# Patient Record
Sex: FEMALE | Race: WHITE | NOT HISPANIC OR LATINO | Employment: OTHER | URBAN - METROPOLITAN AREA
[De-identification: names, ages, dates, MRNs, and addresses within clinical notes are randomized per-mention and may not be internally consistent; named-entity substitution may affect disease eponyms.]

---

## 2017-04-26 RX ORDER — GLATIRAMER ACETATE 40 MG/ML
40 INJECTION, SOLUTION SUBCUTANEOUS 3 TIMES WEEKLY
Qty: 11.76 ML | Refills: 3 | Status: SHIPPED | OUTPATIENT
Start: 2017-04-26 | End: 2017-05-03

## 2017-05-03 ENCOUNTER — OFFICE VISIT (OUTPATIENT)
Dept: NEUROLOGY | Facility: CLINIC | Age: 77
End: 2017-05-03

## 2017-05-03 ENCOUNTER — APPOINTMENT (OUTPATIENT)
Dept: LAB | Facility: HOSPITAL | Age: 77
End: 2017-05-03

## 2017-05-03 VITALS
HEIGHT: 65 IN | WEIGHT: 147.2 LBS | DIASTOLIC BLOOD PRESSURE: 52 MMHG | SYSTOLIC BLOOD PRESSURE: 104 MMHG | HEART RATE: 76 BPM | BODY MASS INDEX: 24.53 KG/M2

## 2017-05-03 DIAGNOSIS — M79.2 NEUROPATHIC PAIN: ICD-10-CM

## 2017-05-03 DIAGNOSIS — G35 MULTIPLE SCLEROSIS (HCC): Primary | ICD-10-CM

## 2017-05-03 DIAGNOSIS — R26.89 BALANCE DISORDER: ICD-10-CM

## 2017-05-03 DIAGNOSIS — R26.9 GAIT ABNORMALITY: ICD-10-CM

## 2017-05-03 DIAGNOSIS — F33.40 RECURRENT MAJOR DEPRESSIVE DISORDER, IN REMISSION (HCC): ICD-10-CM

## 2017-05-03 PROCEDURE — 99214 OFFICE O/P EST MOD 30 MIN: CPT | Performed by: PSYCHIATRY & NEUROLOGY

## 2017-05-03 RX ORDER — CITALOPRAM 40 MG/1
40 TABLET ORAL DAILY
Qty: 30 TABLET | Refills: 11 | Status: SHIPPED | OUTPATIENT
Start: 2017-05-03 | End: 2018-04-29 | Stop reason: SDUPTHER

## 2017-05-04 LAB
ALBUMIN SERPL-MCNC: 4.3 G/DL (ref 3.5–4.8)
ALBUMIN/GLOB SERPL: 2 {RATIO} (ref 1.2–2.2)
ALP SERPL-CCNC: 26 IU/L (ref 39–117)
ALT SERPL-CCNC: 12 IU/L (ref 0–32)
AST SERPL-CCNC: 18 IU/L (ref 0–40)
BASOPHILS # BLD AUTO: 0.1 X10E3/UL (ref 0–0.2)
BASOPHILS NFR BLD AUTO: 1 %
BILIRUB SERPL-MCNC: 0.2 MG/DL (ref 0–1.2)
BUN SERPL-MCNC: 15 MG/DL (ref 8–27)
BUN/CREAT SERPL: 19 (ref 12–28)
CALCIUM SERPL-MCNC: 9.6 MG/DL (ref 8.7–10.3)
CHLORIDE SERPL-SCNC: 103 MMOL/L (ref 96–106)
CO2 SERPL-SCNC: 25 MMOL/L (ref 18–29)
CREAT SERPL-MCNC: 0.77 MG/DL (ref 0.57–1)
EOSINOPHIL # BLD AUTO: 0.1 X10E3/UL (ref 0–0.4)
EOSINOPHIL NFR BLD AUTO: 2 %
ERYTHROCYTE [DISTWIDTH] IN BLOOD BY AUTOMATED COUNT: 13.3 % (ref 12.3–15.4)
GLOBULIN SER CALC-MCNC: 2.2 G/DL (ref 1.5–4.5)
GLUCOSE SERPL-MCNC: 78 MG/DL (ref 65–99)
HCT VFR BLD AUTO: 40.5 % (ref 34–46.6)
HGB BLD-MCNC: 13.8 G/DL (ref 11.1–15.9)
IMM GRANULOCYTES # BLD: 0 X10E3/UL (ref 0–0.1)
IMM GRANULOCYTES NFR BLD: 0 %
LYMPHOCYTES # BLD AUTO: 2 X10E3/UL (ref 0.7–3.1)
LYMPHOCYTES NFR BLD AUTO: 26 %
MCH RBC QN AUTO: 30.4 PG (ref 26.6–33)
MCHC RBC AUTO-ENTMCNC: 34.1 G/DL (ref 31.5–35.7)
MCV RBC AUTO: 89 FL (ref 79–97)
MONOCYTES # BLD AUTO: 0.5 X10E3/UL (ref 0.1–0.9)
MONOCYTES NFR BLD AUTO: 6 %
NEUTROPHILS # BLD AUTO: 4.9 X10E3/UL (ref 1.4–7)
NEUTROPHILS NFR BLD AUTO: 65 %
PLATELET # BLD AUTO: 341 X10E3/UL (ref 150–379)
POTASSIUM SERPL-SCNC: 4.1 MMOL/L (ref 3.5–5.2)
PROT SERPL-MCNC: 6.5 G/DL (ref 6–8.5)
RBC # BLD AUTO: 4.54 X10E6/UL (ref 3.77–5.28)
SODIUM SERPL-SCNC: 145 MMOL/L (ref 134–144)
WBC # BLD AUTO: 7.6 X10E3/UL (ref 3.4–10.8)

## 2017-05-09 LAB
ANNOTATION COMMENT IMP: NORMAL
GAMMA INTERFERON BACKGROUND BLD IA-ACNC: 0.05 IU/ML
M TB IFN-G BLD-IMP: NEGATIVE
M TB IFN-G CD4+ BCKGRND COR BLD-ACNC: 0 IU/ML
M TB IFN-G CD4+ T-CELLS BLD-ACNC: 0.05 IU/ML
MITOGEN IGNF BLD-ACNC: 0.62 IU/ML
SERVICE CMNT-IMP: NORMAL

## 2017-08-09 ENCOUNTER — APPOINTMENT (OUTPATIENT)
Dept: WOMENS IMAGING | Facility: HOSPITAL | Age: 77
End: 2017-08-09

## 2017-08-09 PROCEDURE — G0202 SCR MAMMO BI INCL CAD: HCPCS | Performed by: RADIOLOGY

## 2017-11-02 ENCOUNTER — LAB REQUISITION (OUTPATIENT)
Dept: LAB | Facility: HOSPITAL | Age: 77
End: 2017-11-02

## 2017-11-02 ENCOUNTER — OFFICE VISIT (OUTPATIENT)
Dept: NEUROLOGY | Facility: CLINIC | Age: 77
End: 2017-11-02

## 2017-11-02 VITALS
BODY MASS INDEX: 22.49 KG/M2 | HEART RATE: 74 BPM | WEIGHT: 135 LBS | DIASTOLIC BLOOD PRESSURE: 70 MMHG | HEIGHT: 65 IN | SYSTOLIC BLOOD PRESSURE: 122 MMHG | RESPIRATION RATE: 16 BRPM

## 2017-11-02 DIAGNOSIS — M79.2 NEUROPATHIC PAIN: ICD-10-CM

## 2017-11-02 DIAGNOSIS — G35 MULTIPLE SCLEROSIS (HCC): Primary | ICD-10-CM

## 2017-11-02 DIAGNOSIS — Z00.00 ROUTINE GENERAL MEDICAL EXAMINATION AT A HEALTH CARE FACILITY: ICD-10-CM

## 2017-11-02 DIAGNOSIS — F33.40 RECURRENT MAJOR DEPRESSIVE DISORDER, IN REMISSION (HCC): ICD-10-CM

## 2017-11-02 LAB
ALBUMIN SERPL-MCNC: 4 G/DL (ref 3.2–4.8)
ALBUMIN/GLOB SERPL: 1.9 G/DL (ref 1.5–2.5)
ALP SERPL-CCNC: 31 U/L (ref 25–100)
ALT SERPL W P-5'-P-CCNC: 15 U/L (ref 7–40)
ANION GAP SERPL CALCULATED.3IONS-SCNC: 5 MMOL/L (ref 3–11)
AST SERPL-CCNC: 22 U/L (ref 0–33)
BASOPHILS # BLD AUTO: 0.09 10*3/MM3 (ref 0–0.2)
BASOPHILS NFR BLD AUTO: 1.6 % (ref 0–1)
BILIRUB SERPL-MCNC: 0.5 MG/DL (ref 0.3–1.2)
BUN BLD-MCNC: 16 MG/DL (ref 9–23)
BUN/CREAT SERPL: 26.7 (ref 7–25)
CALCIUM SPEC-SCNC: 9.4 MG/DL (ref 8.7–10.4)
CHLORIDE SERPL-SCNC: 107 MMOL/L (ref 99–109)
CO2 SERPL-SCNC: 28 MMOL/L (ref 20–31)
CREAT BLD-MCNC: 0.6 MG/DL (ref 0.6–1.3)
DEPRECATED RDW RBC AUTO: 44.1 FL (ref 37–54)
EOSINOPHIL # BLD AUTO: 0.17 10*3/MM3 (ref 0–0.3)
EOSINOPHIL NFR BLD AUTO: 3 % (ref 0–3)
ERYTHROCYTE [DISTWIDTH] IN BLOOD BY AUTOMATED COUNT: 13.5 % (ref 11.3–14.5)
GFR SERPL CREATININE-BSD FRML MDRD: 97 ML/MIN/1.73
GLOBULIN UR ELPH-MCNC: 2.1 GM/DL
GLUCOSE BLD-MCNC: 80 MG/DL (ref 70–100)
HCT VFR BLD AUTO: 37.3 % (ref 34.5–44)
HGB BLD-MCNC: 12 G/DL (ref 11.5–15.5)
IMM GRANULOCYTES # BLD: 0.01 10*3/MM3 (ref 0–0.03)
IMM GRANULOCYTES NFR BLD: 0.2 % (ref 0–0.6)
LYMPHOCYTES # BLD AUTO: 1.71 10*3/MM3 (ref 0.6–4.8)
LYMPHOCYTES NFR BLD AUTO: 30.1 % (ref 24–44)
MCH RBC QN AUTO: 28.6 PG (ref 27–31)
MCHC RBC AUTO-ENTMCNC: 32.2 G/DL (ref 32–36)
MCV RBC AUTO: 89 FL (ref 80–99)
MONOCYTES # BLD AUTO: 0.54 10*3/MM3 (ref 0–1)
MONOCYTES NFR BLD AUTO: 9.5 % (ref 0–12)
NEUTROPHILS # BLD AUTO: 3.16 10*3/MM3 (ref 1.5–8.3)
NEUTROPHILS NFR BLD AUTO: 55.6 % (ref 41–71)
PLATELET # BLD AUTO: 259 10*3/MM3 (ref 150–450)
PMV BLD AUTO: 11.6 FL (ref 6–12)
POTASSIUM BLD-SCNC: 3.9 MMOL/L (ref 3.5–5.5)
PROT SERPL-MCNC: 6.1 G/DL (ref 5.7–8.2)
RBC # BLD AUTO: 4.19 10*6/MM3 (ref 3.89–5.14)
SODIUM BLD-SCNC: 140 MMOL/L (ref 132–146)
WBC NRBC COR # BLD: 5.68 10*3/MM3 (ref 3.5–10.8)

## 2017-11-02 PROCEDURE — 80053 COMPREHEN METABOLIC PANEL: CPT | Performed by: PSYCHIATRY & NEUROLOGY

## 2017-11-02 PROCEDURE — 99214 OFFICE O/P EST MOD 30 MIN: CPT | Performed by: PSYCHIATRY & NEUROLOGY

## 2017-11-02 PROCEDURE — 36415 COLL VENOUS BLD VENIPUNCTURE: CPT | Performed by: PSYCHIATRY & NEUROLOGY

## 2017-11-02 PROCEDURE — 85025 COMPLETE CBC W/AUTO DIFF WBC: CPT | Performed by: PSYCHIATRY & NEUROLOGY

## 2017-11-02 RX ORDER — MODAFINIL 100 MG/1
100 TABLET ORAL DAILY
COMMUNITY
End: 2018-12-13

## 2017-11-02 NOTE — PROGRESS NOTES
"Subjective:   Chief Complaint   Patient presents with   • Multiple Sclerosis       Patient ID: Roselia Lobo is a 76 y.o. female.    History of Present Illness     76 y.o.  woman with RRMS, MDD and neuropathic pain returns in follow up.  Last visit on 5/3/17 started Aubagio, pre labs; renewed Celexa, Ampyra, and Lyrica.    RRMS    Tolerating Aubagio without side effects.  Using walker full time.  Ampyra improves balance and walking speed.  No new or worsening sx.      MDD    Mood stable on Celexa.     Neuropathic Pain    Lyrica reduces burning pain legs. Moderate burning/tingling in legs.        The following portions of the patient's history were reviewed and updated as appropriate: allergies, current medications, past medical history, past surgical history and problem list.    Review of Systems   Constitutional: Positive for fatigue. Negative for activity change and unexpected weight change.   HENT: Negative for tinnitus and trouble swallowing.    Eyes: Negative for photophobia and visual disturbance.   Respiratory: Negative for apnea and choking.    Cardiovascular: Negative for leg swelling.   Endocrine: Positive for heat intolerance. Negative for cold intolerance.   Genitourinary: Negative for difficulty urinating, frequency, menstrual problem and urgency.   Musculoskeletal: Positive for gait problem. Negative for back pain.   Skin: Negative for color change.   Allergic/Immunologic: Negative for immunocompromised state.   Neurological: Positive for tremors and speech difficulty. Negative for dizziness, seizures, syncope, facial asymmetry and light-headedness.   Hematological: Negative for adenopathy. Does not bruise/bleed easily.   Psychiatric/Behavioral: Positive for decreased concentration. Negative for behavioral problems, confusion, hallucinations and sleep disturbance.        Objective:  Vitals:    11/02/17 1355   BP: 122/70   Pulse: 74   Resp: 16   Weight: 135 lb (61.2 kg)   Height: 65\" (165.1 cm) "       Neurologic Exam     Mental Status   Oriented to person, place, and time.   Attention: normal. Concentration: normal.   Speech: speech is normal   Level of consciousness: alert  Knowledge: good and consistent with education.   Normal comprehension.     Cranial Nerves     CN II   Visual fields full to confrontation.   Visual acuity: normal  Right visual field deficit: none  Left visual field deficit: none     CN III, IV, VI   Pupils are equal, round, and reactive to light.  Extraocular motions are normal.   Nystagmus: none   Diplopia: none  Ophthalmoparesis: none  Upgaze: normal  Downgaze: normal  Conjugate gaze: present    CN V   Facial sensation intact.   Right corneal reflex: normal  Left corneal reflex: normal    CN VII   Right facial weakness: none  Left facial weakness: none    CN VIII   Hearing: intact    CN IX, X   Palate: symmetric  Right gag reflex: normal  Left gag reflex: normal    CN XI   Right sternocleidomastoid strength: normal  Left sternocleidomastoid strength: normal    CN XII   Tongue: not atrophic  Fasciculations: absent  Tongue deviation: none    Motor Exam   Muscle bulk: normal  Overall muscle tone: normal  Right arm tone: normal  Left arm tone: normal  Right leg tone: normal  Left leg tone: normal    Strength   Strength 5/5 throughout.     Sensory Exam   Light touch normal.     Gait, Coordination, and Reflexes     Gait  Gait: circumduction, shuffling and wide-based (right leg)    Coordination   Romberg: positive  Finger to nose coordination: normal  Heel to shin coordination: abnormal (right leg)  Tandem walking coordination: abnormal    Tremor   Resting tremor: absent  Intention tremor: absent  Action tremor: absent    Reflexes   Reflexes 2+ except as noted.       Physical Exam   Constitutional: She is oriented to person, place, and time. Vital signs are normal. She appears well-developed and well-nourished.   HENT:   Head: Normocephalic.   Eyes: EOM are normal. Pupils are equal, round,  and reactive to light.   Neurological: She is oriented to person, place, and time. She has normal strength. She has an abnormal Heel to Shin Test (right leg), an abnormal Romberg Test and an abnormal Tandem Gait Test. She has a normal Finger-Nose-Finger Test.   Psychiatric: Her speech is normal.   Nursing note and vitals reviewed.    Office Visit on 05/03/2017   Component Date Value Ref Range Status   • WBC 05/03/2017 7.6  3.4 - 10.8 x10E3/uL Final   • RBC 05/03/2017 4.54  3.77 - 5.28 x10E6/uL Final   • Hemoglobin 05/03/2017 13.8  11.1 - 15.9 g/dL Final   • Hematocrit 05/03/2017 40.5  34.0 - 46.6 % Final   • MCV 05/03/2017 89  79 - 97 fL Final   • MCH 05/03/2017 30.4  26.6 - 33.0 pg Final   • MCHC 05/03/2017 34.1  31.5 - 35.7 g/dL Final   • RDW 05/03/2017 13.3  12.3 - 15.4 % Final   • Platelets 05/03/2017 341  150 - 379 x10E3/uL Final   • Neutrophil Rel % 05/03/2017 65  % Final   • Lymphocyte Rel % 05/03/2017 26  % Final   • Monocyte Rel % 05/03/2017 6  % Final   • Eosinophil Rel % 05/03/2017 2  % Final   • Basophil Rel % 05/03/2017 1  % Final   • Neutrophils Absolute 05/03/2017 4.9  1.4 - 7.0 x10E3/uL Final   • Lymphocytes Absolute 05/03/2017 2.0  0.7 - 3.1 x10E3/uL Final   • Monocytes Absolute 05/03/2017 0.5  0.1 - 0.9 x10E3/uL Final   • Eosinophils Absolute 05/03/2017 0.1  0.0 - 0.4 x10E3/uL Final   • Basophils Absolute 05/03/2017 0.1  0.0 - 0.2 x10E3/uL Final   • Immature Granulocyte Rel % 05/03/2017 0  % Final   • Immature Grans Absolute 05/03/2017 0.0  0.0 - 0.1 x10E3/uL Final   • Glucose 05/03/2017 78  65 - 99 mg/dL Final   • BUN 05/03/2017 15  8 - 27 mg/dL Final   • Creatinine 05/03/2017 0.77  0.57 - 1.00 mg/dL Final   • eGFR Non African Am 05/03/2017 76  >59 mL/min/1.73 Final   • eGFR African Am 05/03/2017 87  >59 mL/min/1.73 Final   • BUN/Creatinine Ratio 05/03/2017 19  12 - 28 Final   • Sodium 05/03/2017 145* 134 - 144 mmol/L Final   • Potassium 05/03/2017 4.1  3.5 - 5.2 mmol/L Final   • Chloride  05/03/2017 103  96 - 106 mmol/L Final   • Total CO2 05/03/2017 25  18 - 29 mmol/L Final   • Calcium 05/03/2017 9.6  8.7 - 10.3 mg/dL Final   • Total Protein 05/03/2017 6.5  6.0 - 8.5 g/dL Final   • Albumin 05/03/2017 4.3  3.5 - 4.8 g/dL Final   • Globulin 05/03/2017 2.2  1.5 - 4.5 g/dL Final   • A/G Ratio 05/03/2017 2.0  1.2 - 2.2 Final   • Total Bilirubin 05/03/2017 0.2  0.0 - 1.2 mg/dL Final   • Alkaline Phosphatase 05/03/2017 26* 39 - 117 IU/L Final   • AST (SGOT) 05/03/2017 18  0 - 40 IU/L Final   • ALT (SGPT) 05/03/2017 12  0 - 32 IU/L Final   • QuantiFERON-TB Gold In Tube 05/03/2017 Negative  Negative Final    Comment: The specimen received for QuantiFERON testing was incubated by the  ordering institution.  Specific procedures outlined in our Directory  of Services and in the package insert for the QuantiFERON Gold  (In Tube) test must be followed to enable for proper stimulation of  cells for the production of interferon gamma.     • QuantiFERON Criteria 05/03/2017 Comment   Final    Comment: To be considered positive a specimen should have a TB Ag minus Nil  value greater than or equal to 0.35 IU/mL and in addition the TB Ag  minus Nil value must be greater than or equal to 25% of the Nil  value. There may be insufficient information in these values to  differentiate between some negative and some indeterminate test  values.     • QuantiFERON TB Ag Value 05/03/2017 0.05  IU/mL Final   • QuantiFERON Nil Value 05/03/2017 0.05  IU/mL Final   • QuantiFERON Mitogen Value 05/03/2017 0.62  IU/mL Final   • QFT TB Ag minus Nil Value 05/03/2017 0.00  IU/mL Final   • Interpretation 05/03/2017 Comment   Final    Comment: The QuantiFERON TB Gold (in Tube) assay is intended for use as an aid  in the diagnosis of TB infection. Negative results suggest that there  is no TB infection. In patients with high suspicion of exposure, a  negative test should be repeated. A positive test indicates infection  with Mycobacterium  tuberculosis. Among individuals without  tuberculosis infection, a positive test may be due to exposure to  M. kansasii, M. szulgai or M. marinum. On the Internet, go to  cdc.gov/tb for further details.           Assessment/Plan:       Problems Addressed this Visit        Nervous and Auditory    Multiple sclerosis - Primary     Stable on Aubagio     CBC, CMP          Relevant Orders    CBC & Differential    Comprehensive Metabolic Panel    Neuropathic pain     Controlled on Lyrica            Other    Depression     Psychological condition is unchanged.  Continue current treatment regimen.  Psychological condition  will be reassessed at the next regular appointment.         Relevant Medications    modafinil (PROVIGIL) 100 MG tablet    Teriflunomide (AUBAGIO) 14 MG tablet

## 2017-11-03 LAB
ALBUMIN SERPL-MCNC: 4 G/DL (ref 3.2–4.8)
ALBUMIN/GLOB SERPL: 1.9 G/DL
ALP SERPL-CCNC: 31 U/L (ref 25–100)
AST SERPL-CCNC: 22 U/L (ref 0–33)
BASOPHILS # BLD AUTO: 0.09 10E3/MM3 (ref 0–0.2)
BASOPHILS NFR BLD AUTO: 1.6 % (ref 0–1)
BILIRUB SERPL-MCNC: 0.5 MG/DL (ref 0.3–1.2)
BUN SERPL-MCNC: 16 MG/DL (ref 9–23)
BUN/CREAT SERPL: 26.7 (ref 7–25)
CALCIUM SERPL-MCNC: 9.4 MG/DL (ref 8.7–10.4)
CHLORIDE SERPL-SCNC: 107 MMOL/L (ref 99–109)
CREAT SERPL-MCNC: 0.6 MG/DL (ref 0.6–1.3)
EOSINOPHIL # BLD AUTO: 0.17 10E3/MM3 (ref 0.1–0.3)
EOSINOPHIL NFR BLD AUTO: 3 % (ref 0–3)
ERYTHROCYTE [DISTWIDTH] IN BLOOD BY AUTOMATED COUNT: 13.5 % (ref 11.3–14.5)
GFR SERPLBLD CREATININE-BSD FMLA CKD-EPI: 97 ML/MIN/1.732
GLOBULIN SER CALC-MCNC: 2.1 G/DL
GLUCOSE SERPL-MCNC: 80 MG/DL (ref 70–100)
HCT VFR BLD AUTO: 37.3 % (ref 34.5–44)
HGB BLD-MCNC: 12 G/DL (ref 11.5–15.5)
IMM GRANULOCYTES # BLD: 0.01 10E3/MM3 (ref 0–0.03)
IMM GRANULOCYTES NFR BLD: 0.2 % (ref 0–0.6)
LYMPHOCYTES # BLD AUTO: 1.71 10E3/MM3 (ref 0.6–4.8)
LYMPHOCYTES NFR BLD AUTO: 30.1 % (ref 24–44)
MCH RBC QN AUTO: 28.6 PG (ref 27–31)
MCHC RBC AUTO-ENTMCNC: 32.2 G/DL (ref 32–36)
MCV RBC AUTO: 89 FL (ref 80–99)
MONOCYTES # BLD AUTO: 0.54 10E3/MM3 (ref 0–1)
MONOCYTES NFR BLD AUTO: 9.5 % (ref 0–12)
NEUTROPHILS # BLD AUTO: 3.16 10E3/MM3 (ref 1.5–8.3)
NEUTROPHILS NFR BLD AUTO: 55.6 % (ref 41–71)
PLATELET # BLD AUTO: 259 10E3/MM3 (ref 150–450)
POTASSIUM SERPL-SCNC: 3.9 MMOL/L (ref 3.5–5.5)
PROT SERPL-MCNC: 6.1 G/DL (ref 5.7–8.2)
RBC # BLD AUTO: 4.19 10E6/MM3 (ref 3.89–5.14)
SODIUM SERPL-SCNC: 140 MMOL/L (ref 132–146)
WBC # BLD AUTO: 5.68 10E3/MM3 (ref 3.5–10.8)

## 2017-12-28 RX ORDER — PREGABALIN 200 MG/1
200 CAPSULE ORAL 2 TIMES DAILY
Qty: 60 CAPSULE | Refills: 4 | OUTPATIENT
Start: 2017-12-28 | End: 2021-12-15

## 2017-12-28 NOTE — TELEPHONE ENCOUNTER
Is this ok to refill for the patient till their follow up appointment? Follow up with us is on 5/7/2018. Thanks!!

## 2018-04-30 RX ORDER — CITALOPRAM 40 MG/1
TABLET ORAL
Qty: 90 TABLET | Refills: 0 | Status: SHIPPED | OUTPATIENT
Start: 2018-04-30 | End: 2020-01-01

## 2018-05-07 ENCOUNTER — APPOINTMENT (OUTPATIENT)
Dept: LAB | Facility: HOSPITAL | Age: 78
End: 2018-05-07

## 2018-05-07 ENCOUNTER — LAB REQUISITION (OUTPATIENT)
Dept: LAB | Facility: HOSPITAL | Age: 78
End: 2018-05-07

## 2018-05-07 ENCOUNTER — OFFICE VISIT (OUTPATIENT)
Dept: NEUROLOGY | Facility: CLINIC | Age: 78
End: 2018-05-07

## 2018-05-07 VITALS
RESPIRATION RATE: 18 BRPM | BODY MASS INDEX: 22.49 KG/M2 | DIASTOLIC BLOOD PRESSURE: 70 MMHG | SYSTOLIC BLOOD PRESSURE: 118 MMHG | OXYGEN SATURATION: 97 % | WEIGHT: 135 LBS | HEART RATE: 76 BPM | HEIGHT: 65 IN

## 2018-05-07 DIAGNOSIS — F33.40 RECURRENT MAJOR DEPRESSIVE DISORDER, IN REMISSION (HCC): ICD-10-CM

## 2018-05-07 DIAGNOSIS — R20.2 NUMBNESS AND TINGLING OF BOTH LEGS: ICD-10-CM

## 2018-05-07 DIAGNOSIS — G35 MULTIPLE SCLEROSIS (HCC): ICD-10-CM

## 2018-05-07 DIAGNOSIS — G35 MULTIPLE SCLEROSIS (HCC): Primary | ICD-10-CM

## 2018-05-07 DIAGNOSIS — R20.0 NUMBNESS AND TINGLING OF BOTH LEGS: ICD-10-CM

## 2018-05-07 DIAGNOSIS — Z00.00 ROUTINE GENERAL MEDICAL EXAMINATION AT A HEALTH CARE FACILITY: ICD-10-CM

## 2018-05-07 DIAGNOSIS — M79.2 NEUROPATHIC PAIN: ICD-10-CM

## 2018-05-07 LAB
ALBUMIN SERPL-MCNC: 3.9 G/DL (ref 3.2–4.8)
ALBUMIN/GLOB SERPL: 2.1 G/DL (ref 1.5–2.5)
ALP SERPL-CCNC: 28 U/L (ref 25–100)
ALT SERPL W P-5'-P-CCNC: 18 U/L (ref 7–40)
ANION GAP SERPL CALCULATED.3IONS-SCNC: 7 MMOL/L (ref 3–11)
AST SERPL-CCNC: 21 U/L (ref 0–33)
BASOPHILS # BLD AUTO: 0.07 10*3/MM3 (ref 0–0.2)
BASOPHILS NFR BLD AUTO: 1.1 % (ref 0–1)
BILIRUB SERPL-MCNC: 0.4 MG/DL (ref 0.3–1.2)
BUN BLD-MCNC: 14 MG/DL (ref 9–23)
BUN/CREAT SERPL: 23.3 (ref 7–25)
CALCIUM SPEC-SCNC: 8.7 MG/DL (ref 8.7–10.4)
CHLORIDE SERPL-SCNC: 110 MMOL/L (ref 99–109)
CO2 SERPL-SCNC: 28 MMOL/L (ref 20–31)
CREAT BLD-MCNC: 0.6 MG/DL (ref 0.6–1.3)
DEPRECATED RDW RBC AUTO: 44.1 FL (ref 37–54)
EOSINOPHIL # BLD AUTO: 0.16 10*3/MM3 (ref 0–0.3)
EOSINOPHIL NFR BLD AUTO: 2.5 % (ref 0–3)
ERYTHROCYTE [DISTWIDTH] IN BLOOD BY AUTOMATED COUNT: 13.5 % (ref 11.3–14.5)
GFR SERPL CREATININE-BSD FRML MDRD: 97 ML/MIN/1.73
GLOBULIN UR ELPH-MCNC: 1.9 GM/DL
GLUCOSE BLD-MCNC: 81 MG/DL (ref 70–100)
HCT VFR BLD AUTO: 36.9 % (ref 34.5–44)
HGB BLD-MCNC: 12.1 G/DL (ref 11.5–15.5)
IMM GRANULOCYTES # BLD: 0.02 10*3/MM3 (ref 0–0.03)
IMM GRANULOCYTES NFR BLD: 0.3 % (ref 0–0.6)
LYMPHOCYTES # BLD AUTO: 1.8 10*3/MM3 (ref 0.6–4.8)
LYMPHOCYTES NFR BLD AUTO: 28.4 % (ref 24–44)
MCH RBC QN AUTO: 29.4 PG (ref 27–31)
MCHC RBC AUTO-ENTMCNC: 32.8 G/DL (ref 32–36)
MCV RBC AUTO: 89.8 FL (ref 80–99)
MONOCYTES # BLD AUTO: 0.48 10*3/MM3 (ref 0–1)
MONOCYTES NFR BLD AUTO: 7.6 % (ref 0–12)
NEUTROPHILS # BLD AUTO: 3.83 10*3/MM3 (ref 1.5–8.3)
NEUTROPHILS NFR BLD AUTO: 60.4 % (ref 41–71)
PLATELET # BLD AUTO: 248 10*3/MM3 (ref 150–450)
PMV BLD AUTO: 12.7 FL (ref 6–12)
POTASSIUM BLD-SCNC: 3.5 MMOL/L (ref 3.5–5.5)
PROT SERPL-MCNC: 5.8 G/DL (ref 5.7–8.2)
RBC # BLD AUTO: 4.11 10*6/MM3 (ref 3.89–5.14)
SODIUM BLD-SCNC: 145 MMOL/L (ref 132–146)
WBC NRBC COR # BLD: 6.34 10*3/MM3 (ref 3.5–10.8)

## 2018-05-07 PROCEDURE — 85025 COMPLETE CBC W/AUTO DIFF WBC: CPT | Performed by: PSYCHIATRY & NEUROLOGY

## 2018-05-07 PROCEDURE — 99214 OFFICE O/P EST MOD 30 MIN: CPT | Performed by: PSYCHIATRY & NEUROLOGY

## 2018-05-07 PROCEDURE — 80053 COMPREHEN METABOLIC PANEL: CPT | Performed by: PSYCHIATRY & NEUROLOGY

## 2018-05-07 NOTE — PROGRESS NOTES
Subjective:   Chief Complaint   Patient presents with   • Multiple Sclerosis       Patient ID: Roselia Lobo is a 77 y.o. female.    History of Present Illness     77 y.o.  woman with RRMS, MDD and neuropathic pain returns in follow up.  Last visit on 11/2/17 continued Aubagio, Celexa, Ampyra, and Lyrica, ordered labs.    11/2/17:  CBC,CMP - NCS    RRMS    Notes a few falls when not using walker in kitchen.  Fatigue is severe.      Tolerating Aubagio without side effects.  Ampyra improves balance and walking speed. No new or worsening sx.  T25FW 12.8s sec    MDD    Mood unchagned on Celexa.     Neuropathic Pain    Lyrica reduces burning pain legs. Moderate burning/tingling in legs.  Feet are burning on the bottom all the time.  Feet are red and discolored.      Past Medical History:   Diagnosis Date   • Depression    • Multiple sclerosis      · Assessed By: Isaac Torres (Neurology); Last Assessed: 11 Apr 2013           The following portions of the patient's history were reviewed and updated as appropriate: allergies, current medications, past medical history, past surgical history and problem list.    Review of Systems   Constitutional: Positive for fatigue. Negative for activity change and unexpected weight change.   HENT: Negative for tinnitus and trouble swallowing.    Eyes: Negative for photophobia and visual disturbance.   Respiratory: Negative for apnea and choking.    Cardiovascular: Negative for leg swelling.   Endocrine: Positive for heat intolerance. Negative for cold intolerance.   Genitourinary: Negative for difficulty urinating, frequency, menstrual problem and urgency.   Musculoskeletal: Positive for gait problem. Negative for back pain.   Skin: Negative for color change.   Allergic/Immunologic: Negative for immunocompromised state.   Neurological: Positive for tremors and speech difficulty. Negative for dizziness, seizures, syncope, facial asymmetry and light-headedness.   Hematological: Negative  "for adenopathy. Does not bruise/bleed easily.   Psychiatric/Behavioral: Positive for decreased concentration. Negative for behavioral problems, confusion, hallucinations and sleep disturbance.        Objective:  Vitals:    05/07/18 1421   BP: 118/70   BP Location: Left arm   Patient Position: Sitting   Cuff Size: Adult   Pulse: 76   Resp: 18   SpO2: 97%   Weight: 61.2 kg (135 lb)   Height: 165.1 cm (65\")       Neurologic Exam     Mental Status   Attention: normal. Concentration: normal.   Level of consciousness: alert  Knowledge: good and consistent with education.   Normal comprehension.     Cranial Nerves     CN II   Visual fields full to confrontation.   Visual acuity: normal  Right visual field deficit: none  Left visual field deficit: none     CN III, IV, VI   Nystagmus: none   Diplopia: none  Ophthalmoparesis: none  Upgaze: normal  Downgaze: normal  Conjugate gaze: present    CN V   Facial sensation intact.   Right corneal reflex: normal  Left corneal reflex: normal    CN VII   Right facial weakness: none  Left facial weakness: none    CN VIII   Hearing: intact    CN IX, X   Palate: symmetric  Right gag reflex: normal  Left gag reflex: normal    CN XI   Right sternocleidomastoid strength: normal  Left sternocleidomastoid strength: normal    CN XII   Tongue: not atrophic  Fasciculations: absent  Tongue deviation: none    Motor Exam   Muscle bulk: normal  Overall muscle tone: normal  Right arm tone: normal  Left arm tone: normal  Right leg tone: normal  Left leg tone: normal    Sensory Exam   Right arm light touch: normal  Left arm light touch: normal  Right leg light touch: decreased from knee  Left leg light touch: decreased from knee  Right arm pinprick: normal  Left arm pinprick: normal  Right leg pinprick: decreased from knee  Left leg pinprick: decreased from knee    Gait, Coordination, and Reflexes     Gait  Gait: circumduction, shuffling and wide-based (right leg)    Coordination   Romberg: " positive  Tandem walking coordination: abnormal    Tremor   Resting tremor: absent  Intention tremor: absent  Action tremor: absent    Reflexes   Reflexes 2+ except as noted.   Right patellar: 0  Left patellar: 0  Right achilles: 0  Left achilles: 0      Physical Exam   Constitutional: She appears well-developed and well-nourished.   Neurological: She has an abnormal Romberg Test and an abnormal Tandem Gait Test.   Reflex Scores:       Patellar reflexes are 0 on the right side and 0 on the left side.       Achilles reflexes are 0 on the right side and 0 on the left side.  Nursing note and vitals reviewed.    Orders Only on 11/02/2017   Component Date Value Ref Range Status   • WBC 11/03/2017 5.68  3.50 - 10.80 10E3/mm3 Final   • RBC 11/03/2017 4.19  3.89 - 5.14 10E6/mm3 Final   • Hemoglobin 11/03/2017 12.0  11.5 - 15.5 g/dL Final   • Hematocrit 11/03/2017 37.3  34.5 - 44.0 % Final   • MCV 11/03/2017 89.0  80.0 - 99.0 fL Final   • MCH 11/03/2017 28.6  27.0 - 31.0 pg Final   • MCHC 11/03/2017 32.2  32.0 - 36.0 g/dL Final   • RDW 11/03/2017 13.5  11.3 - 14.5 % Final   • Platelets 11/03/2017 259  150 - 450 10E3/mm3 Final   • Neutrophil Rel % 11/03/2017 55.6  41.0 - 71.0 % Final   • Lymphocyte Rel % 11/03/2017 30.1  24.0 - 44.0 % Final   • Monocyte Rel % 11/03/2017 9.5  0.0 - 12.0 % Final   • Eosinophil Rel % 11/03/2017 3.0  0.0 - 3.0 % Final   • Basophil Rel % 11/03/2017 1.6* 0.0 - 1.0 % Final   • Neutrophils Absolute 11/03/2017 3.16  1.50 - 8.30 10E3/mm3 Final   • Lymphocytes Absolute 11/03/2017 1.71  0.60 - 4.80 10E3/mm3 Final   • Monocytes Absolute 11/03/2017 0.54  0.00 - 1.00 10E3/mm3 Final   • Eosinophils Absolute 11/03/2017 0.17  0.10 - 0.30 10E3/mm3 Final   • Basophils Absolute 11/03/2017 0.09  0.00 - 0.20 10E3/mm3 Final   • Immature Granulocyte Rel % 11/03/2017 0.2  0.0 - 0.6 % Final   • Immature Grans Absolute 11/03/2017 0.01  0.00 - 0.03 10E3/mm3 Final   • Glucose 11/03/2017 80  70 - 100 mg/dL Final   • BUN  11/03/2017 16  9 - 23 mg/dL Final   • Creatinine 11/03/2017 0.60  0.60 - 1.30 mg/dL Final   • eGFR Non  Am 11/03/2017 97  mL/min/1.732 Final    Comment:                                 --------------------------------------                                  [employer] Guidelines                                  Stage      Description           GFR                                  1          Normal or High        90+                                  2          Mild decrease         60-89                                  3          Moderate decrease     30-59                                  4          Severe decrease       15-29                                  5          Kidney failure        <15     • BUN/Creatinine Ratio 11/03/2017 26.7* 7.0 - 25.0 Final   • Sodium 11/03/2017 140  132 - 146 mmol/L Final   • Potassium 11/03/2017 3.9  3.5 - 5.5 mmol/L Final   • Chloride 11/03/2017 107  99 - 109 mmol/L Final   • Calcium 11/03/2017 9.4  8.7 - 10.4 mg/dL Final   • Total Protein 11/03/2017 6.1  5.7 - 8.2 g/dL Final   • Albumin 11/03/2017 4.0  3.2 - 4.8 g/dL Final   • Globulin 11/03/2017 2.1  g/dL Final   • A/G Ratio 11/03/2017 1.9  g/dL Final   • Total Bilirubin 11/03/2017 0.5  0.3 - 1.2 mg/dL Final   • Alkaline Phosphatase 11/03/2017 31  25 - 100 U/L Final   • AST (SGOT) 11/03/2017 22  0 - 33 U/L Final         Assessment/Plan:       Problems Addressed this Visit        Nervous and Auditory    Multiple sclerosis - Primary     Sx stable on Aubagio     CBC, CMP    MRI Brain         Relevant Orders    CBC & Differential    Comprehensive Metabolic Panel    MRI Brain Without Contrast    Neuropathic pain     Worsening pain and numbness in feet.      Continue Lyrica 200 mg BID    EMG/NCS cami LE             Other    Depression     Psychological condition is unchanged.  Continue current treatment regimen.  Psychological condition  will be reassessed at the next regular appointment.           Other Visit  Diagnoses     Numbness and tingling of both legs        Relevant Orders    EMG

## 2018-05-12 ENCOUNTER — RESULTS ENCOUNTER (OUTPATIENT)
Dept: NEUROLOGY | Facility: CLINIC | Age: 78
End: 2018-05-12

## 2018-05-12 DIAGNOSIS — G35 MULTIPLE SCLEROSIS (HCC): ICD-10-CM

## 2018-05-14 RX ORDER — DALFAMPRIDINE 10 MG/1
TABLET, FILM COATED, EXTENDED RELEASE ORAL
Qty: 60 TABLET | Refills: 10 | Status: SHIPPED | OUTPATIENT
Start: 2018-05-14 | End: 2019-03-25 | Stop reason: SDUPTHER

## 2018-05-14 RX ORDER — RENAGEL 800 MG/1
TABLET ORAL
Qty: 28 TABLET | Refills: 10 | Status: SHIPPED | OUTPATIENT
Start: 2018-05-14 | End: 2019-03-25 | Stop reason: SDUPTHER

## 2018-05-23 ENCOUNTER — OFFICE VISIT (OUTPATIENT)
Dept: NEUROLOGY | Facility: CLINIC | Age: 78
End: 2018-05-23

## 2018-05-23 DIAGNOSIS — M79.2 NEUROPATHIC PAIN: Primary | ICD-10-CM

## 2018-05-23 PROCEDURE — 95886 MUSC TEST DONE W/N TEST COMP: CPT | Performed by: PSYCHIATRY & NEUROLOGY

## 2018-05-23 PROCEDURE — 95910 NRV CNDJ TEST 7-8 STUDIES: CPT | Performed by: PSYCHIATRY & NEUROLOGY

## 2018-05-23 NOTE — PROGRESS NOTES
[facility] Neurology [place]   Electrodiagnostic Laboratory    Nerve Conduction & EMG Report        Patient:  Roselia oLbo   Patient ID: [id number]   YOB: 1940  Sex:  female      Exam Physician: Isaac Torres MD     Electromyogram and Nerve Conduction Velocity Procedure Note    Hx: 77 y.o. right handed female with complaint of numbness involving the both lower extremities. Symptoms have been present for several months and were provoked by no clear event. Significant past medical history includes multiple sclerosis. Medications include Lyrica. Family history no family history of nerve or muscle disease.    Exam: Motor power is normal in LE. There is no atrophy. There are no fasciculations. Deep tendon reflexes are absent. Sensory exam is abnormal distal symmetrical loss of pin and light touch.    Edx studies of the bilateral lower extremities were performed to evaluate for peripheral neuropathy    NCS Examination   For sensory nerve conduction studies, the amplitude is measured peak-to-peak, the latency reported is the distal peak latency, and the conduction velocity, if measured, is determined from onset latencies and is over the forearm.   For motor nerve conduction studies, the amplitude is measured baseline-to-peak, the latency reported is the distal onset latency, the conduction velocity is calculated over the forearm, and the F wave latency is the minimum latency.   Unless otherwise noted, the hand temperature was monitored continuously and remained between 32°C and 36°C during the performance of the NCSs.        Sensory NCS      Nerve / Sites Rec. Site Onset Lat Peak Lat NP Amp PP Amp Segments Distance Velocity     ms ms µV µV  cm m/s   L SURAL - Lat Mall Antidr      Calf Lat Mall NR NR NR NR Calf - Lat Mall 14 NR      Ref.   4.20 5.0  Ref.     R SURAL - Lat Mall Antidr      Calf Lat Mall NR NR NR NR Calf - Lat Mall 14 NR      Ref.   4.20 5.0  Ref.       Motor NCS      Nerve / Sites Rec.  Site Lat Amp Seq Amp Segments Dist Velocity     ms mV %  cm m/s   L COMM PERONEAL - EDB      Ankle EDB NR NR NR Ankle - EDB 8       Ref.  6.00 2.0  Ref.     R COMM PERONEAL - EDB      Ankle EDB NR NR NR Ankle - EDB 8       Ref.  6.00 2.0  Ref.     L TIBIAL (KNEE) - AH      Ankle AH 4.25 5.8 100 Ankle - AH 8       Ref.  6.00 2.5  Ref.        Knee AH 15.80 2.7 47 Knee - Ankle 39 33.8      Ref.     Ref.  39.0   R TIBIAL (KNEE) - AH      Ankle AH 5.30 5.6 100 Ankle - AH 8       Ref.  6.00 2.5  Ref.        Knee AH 15.00 5.1 91.1 Knee - Ankle 39 40.2      Ref.     Ref.  39.0   R COMM PERONEAL - Tib Ant      Fib Head Tib Ant 2.85 1.7 100 Fib Head - Tib Ant        Knee Tib Ant 3.95 1.9 112 Knee - Fib Head 9 81.8   L COMM PERONEAL - Tib Ant      Fib Head Tib Ant 3.15 2.1 100 Fib Head - Tib Ant        Knee Tib Ant 4.90 2.7 130 Knee - Fib Head 9 51.4     F  Wave      Nerve Fmin    ms   L TIBIAL (KNEE) 57.05   REF 56.00   R TIBIAL (KNEE) 57.50   REF 56.00     H Reflex      Nerve H Lat    ms   L TIBIAL (KNEE) - [place] (S1) 36.50   Ref 36.00   R TIBIAL (KNEE) - [place]ole[place] (S1) 36.65   Ref 36.00             EMG Examination   The study was performed with a concentric needle electrode. Fibrillation and fasciculation activity is graded from none (0) to continuous (4+). The configuration and recruitment pattern of motor unit action potentials under voluntary control, if not normal, are described    EMG Summary Table     Spontaneous MUAP Recruitment    IA Fib PSW Fasc H.F. Amp Dur. PPP Pattern   L. ILIOPSOAS N None None None None N N N N   R. ILIOPSOAS N None None None None N N N N   L. GLUTEUS MAX N None None None None N N N N   R. GLUTEUS MAX N None None None None N N N N   L. QUADRICEPS N None None None None N N N N   R. QUADRICEPS N None None None None N N N N   L. BIC FEM (L HEAD) N None None None None N N N N   R. BIC FEM (L HEAD) N None None None None N N N N   L. GASTROCN (MED) N None None None None N N N N   R. GASTROCN (MED) N  None None None None N N N N   L. TIB ANTERIOR N None None None None N N N N   R. TIB ANTERIOR N None None None None N N N N       · Left common peroneal motor responses were absent from the EDB and intact at the TA  · Left tibial motor responses revealed decreased conduction velocities from fibular head to ankle and F waves were prolonged  · Left sural sensory responses were absent  · Left H-reflex responses were prolonged  · Right common peroneal motor responses were absent from the EDB and intact at the TA  · Right tibial motor responses were normal and F waves were prolonged  · Right sural sensory responses were absent  · Right  H-reflex responses were prolonged  · Needle examination with a concentric needle electrode of selected muscles of the bilateral lower extremities were normal      Conclusion: This study showed neurophysiologic evidence of a 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          Instrument used:  Teca Synergy        Performed by:          Isaac Torres MD

## 2018-06-15 LAB
ALBUMIN SERPL-MCNC: 3.9 G/DL (ref 3.2–4.8)
ALBUMIN/GLOB SERPL: 2.1 G/DL (ref 1.5–2.5)
ALP SERPL-CCNC: 28 U/L (ref 25–100)
ALT SERPL-CCNC: 18 U/L (ref 7–40)
AST SERPL-CCNC: 21 U/L (ref 0–33)
BASOPHILS # BLD AUTO: 0.07 10E3/MM3 (ref 0–0.2)
BASOPHILS NFR BLD AUTO: 1.1 % (ref 0–1)
BILIRUB SERPL-MCNC: 0.4 MG/DL (ref 0.3–1.2)
BUN SERPL-MCNC: 14 MG/DL (ref 9–23)
BUN/CREAT SERPL: 23.3 (ref 7–25)
CALCIUM SERPL-MCNC: 8.7 MG/DL (ref 8.7–10.4)
CHLORIDE SERPL-SCNC: 110 MMOL/L (ref 99–109)
CO2 SERPL-SCNC: 28 MMOL/L (ref 20–31)
CREAT SERPL-MCNC: 0.6 MG/DL (ref 0.6–1.3)
EOSINOPHIL # BLD AUTO: 0.16 10E3/MM3 (ref 0.1–0.3)
EOSINOPHIL NFR BLD AUTO: 2.5 % (ref 0–3)
ERYTHROCYTE [DISTWIDTH] IN BLOOD BY AUTOMATED COUNT: 13.5 % (ref 11.3–14.5)
GFR SERPLBLD CREATININE-BSD FMLA CKD-EPI: 97 ML/MIN/1.73
GLOBULIN SER CALC-MCNC: 1.9 G/DL
GLUCOSE SERPL-MCNC: 81 MG/DL (ref 70–100)
HCT VFR BLD AUTO: 36.9 % (ref 34.5–44)
HGB BLD-MCNC: 12.1 G/DL (ref 11.5–15.5)
IMM GRANULOCYTES # BLD: 0.02 10E3/MM3 (ref 0–0.03)
IMM GRANULOCYTES NFR BLD: 0.3 % (ref 0–0.6)
LYMPHOCYTES # BLD AUTO: 1.8 10E3/MM3 (ref 0.6–4.8)
LYMPHOCYTES NFR BLD AUTO: 28.4 % (ref 24–44)
MCH RBC QN AUTO: 29.4 PG (ref 27–31)
MCHC RBC AUTO-ENTMCNC: 32.8 G/DL (ref 32–36)
MCV RBC AUTO: 89.8 FL (ref 80–99)
MONOCYTES # BLD AUTO: 0.48 10E3/MM3 (ref 0–1)
MONOCYTES NFR BLD AUTO: 7.6 % (ref 0–12)
NEUTROPHILS # BLD AUTO: 3.83 10E3/MM3 (ref 1.5–8.3)
NEUTROPHILS NFR BLD AUTO: 60.4 % (ref 41–71)
PLATELET # BLD AUTO: 248 10E3/MM3 (ref 150–450)
POTASSIUM SERPL-SCNC: 3.5 MMOL/L (ref 3.5–5.5)
PROT SERPL-MCNC: 5.8 G/DL (ref 5.7–8.2)
RBC # BLD AUTO: 4.11 10E6/MM3 (ref 3.89–5.14)
SODIUM SERPL-SCNC: 145 MMOL/L (ref 132–146)
WBC # BLD AUTO: 6.34 10E3/MM3 (ref 3.5–10.8)

## 2018-09-19 ENCOUNTER — APPOINTMENT (OUTPATIENT)
Dept: WOMENS IMAGING | Facility: HOSPITAL | Age: 78
End: 2018-09-19

## 2018-09-19 PROCEDURE — 77067 SCR MAMMO BI INCL CAD: CPT | Performed by: RADIOLOGY

## 2018-11-19 DIAGNOSIS — G35 MULTIPLE SCLEROSIS (HCC): Primary | ICD-10-CM

## 2018-12-13 ENCOUNTER — OFFICE VISIT (OUTPATIENT)
Dept: NEUROLOGY | Facility: CLINIC | Age: 78
End: 2018-12-13

## 2018-12-13 ENCOUNTER — LAB REQUISITION (OUTPATIENT)
Dept: LAB | Facility: HOSPITAL | Age: 78
End: 2018-12-13

## 2018-12-13 VITALS
OXYGEN SATURATION: 98 % | SYSTOLIC BLOOD PRESSURE: 122 MMHG | WEIGHT: 120 LBS | HEIGHT: 65 IN | HEART RATE: 78 BPM | DIASTOLIC BLOOD PRESSURE: 76 MMHG | BODY MASS INDEX: 19.99 KG/M2

## 2018-12-13 DIAGNOSIS — M79.2 NEUROPATHIC PAIN: ICD-10-CM

## 2018-12-13 DIAGNOSIS — F33.40 RECURRENT MAJOR DEPRESSIVE DISORDER, IN REMISSION (HCC): ICD-10-CM

## 2018-12-13 DIAGNOSIS — G62.9 POLYNEUROPATHY: ICD-10-CM

## 2018-12-13 DIAGNOSIS — Z00.00 ROUTINE GENERAL MEDICAL EXAMINATION AT A HEALTH CARE FACILITY: ICD-10-CM

## 2018-12-13 DIAGNOSIS — G35 MULTIPLE SCLEROSIS (HCC): Primary | ICD-10-CM

## 2018-12-13 LAB
ALBUMIN SERPL-MCNC: 4.23 G/DL (ref 3.2–4.8)
ALBUMIN/GLOB SERPL: 2.3 G/DL (ref 1.5–2.5)
ALP SERPL-CCNC: 30 U/L (ref 25–100)
ALT SERPL W P-5'-P-CCNC: 18 U/L (ref 7–40)
ANION GAP SERPL CALCULATED.3IONS-SCNC: 8 MMOL/L (ref 3–11)
AST SERPL-CCNC: 21 U/L (ref 0–33)
BASOPHILS # BLD AUTO: 0.06 10*3/MM3 (ref 0–0.2)
BASOPHILS NFR BLD AUTO: 0.8 % (ref 0–1)
BILIRUB SERPL-MCNC: 0.4 MG/DL (ref 0.3–1.2)
BUN BLD-MCNC: 10 MG/DL (ref 9–23)
BUN/CREAT SERPL: 14.9 (ref 7–25)
CALCIUM SPEC-SCNC: 9.6 MG/DL (ref 8.7–10.4)
CHLORIDE SERPL-SCNC: 108 MMOL/L (ref 99–109)
CO2 SERPL-SCNC: 27 MMOL/L (ref 20–31)
CREAT BLD-MCNC: 0.67 MG/DL (ref 0.6–1.3)
DEPRECATED RDW RBC AUTO: 44.9 FL (ref 37–54)
EOSINOPHIL # BLD AUTO: 0.19 10*3/MM3 (ref 0–0.3)
EOSINOPHIL NFR BLD AUTO: 2.7 % (ref 0–3)
ERYTHROCYTE [DISTWIDTH] IN BLOOD BY AUTOMATED COUNT: 13.4 % (ref 11.3–14.5)
GFR SERPL CREATININE-BSD FRML MDRD: 85 ML/MIN/1.73
GLOBULIN UR ELPH-MCNC: 1.9 GM/DL
GLUCOSE BLD-MCNC: 89 MG/DL (ref 70–100)
HCT VFR BLD AUTO: 39 % (ref 34.5–44)
HGB BLD-MCNC: 12.6 G/DL (ref 11.5–15.5)
IMM GRANULOCYTES # BLD: 0.01 10*3/MM3 (ref 0–0.03)
IMM GRANULOCYTES NFR BLD: 0.1 % (ref 0–0.6)
LYMPHOCYTES # BLD AUTO: 2.1 10*3/MM3 (ref 0.6–4.8)
LYMPHOCYTES NFR BLD AUTO: 29.4 % (ref 24–44)
MCH RBC QN AUTO: 29.8 PG (ref 27–31)
MCHC RBC AUTO-ENTMCNC: 32.3 G/DL (ref 32–36)
MCV RBC AUTO: 92.2 FL (ref 80–99)
MONOCYTES # BLD AUTO: 0.65 10*3/MM3 (ref 0–1)
MONOCYTES NFR BLD AUTO: 9.1 % (ref 0–12)
NEUTROPHILS # BLD AUTO: 4.15 10*3/MM3 (ref 1.5–8.3)
NEUTROPHILS NFR BLD AUTO: 58 % (ref 41–71)
PLATELET # BLD AUTO: 296 10*3/MM3 (ref 150–450)
PMV BLD AUTO: 11.7 FL (ref 6–12)
POTASSIUM BLD-SCNC: 3.9 MMOL/L (ref 3.5–5.5)
PROT SERPL-MCNC: 6.1 G/DL (ref 5.7–8.2)
RBC # BLD AUTO: 4.23 10*6/MM3 (ref 3.89–5.14)
SODIUM BLD-SCNC: 143 MMOL/L (ref 132–146)
WBC NRBC COR # BLD: 7.15 10*3/MM3 (ref 3.5–10.8)

## 2018-12-13 PROCEDURE — 99214 OFFICE O/P EST MOD 30 MIN: CPT | Performed by: PSYCHIATRY & NEUROLOGY

## 2018-12-13 PROCEDURE — 80053 COMPREHEN METABOLIC PANEL: CPT | Performed by: PSYCHIATRY & NEUROLOGY

## 2018-12-13 PROCEDURE — 85025 COMPLETE CBC W/AUTO DIFF WBC: CPT | Performed by: PSYCHIATRY & NEUROLOGY

## 2018-12-13 NOTE — PROGRESS NOTES
Subjective:   Chief Complaint   Patient presents with   • Multiple Sclerosis       Patient ID: Roselia Lobo is a 78 y.o. female.    History of Present Illness     78 y.o.  woman with RRMS, MDD and neuropathic pain returns in follow up.  Last visit on 5/7/18 continued Aubagio, Celexa, Ampyra, and Lyrica, ordered EMG and labs.    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  Legs are weak and trouble standing for over 10 minutes.      Fatigue is severe.      Tolerating Aubagio without side effects.      Ampyra improves balance and walking speed. No new or worsening sx.      T25FW 12.8s sec    MDD    Mood unchagned on Celexa.     Neuropathic Pain    Lyrica reduces burning pain legs. Moderate burning/tingling in legs.  Feet are burning on the bottom all the time.  Feet are red and discolored.      Past Medical History:   Diagnosis Date   • Depression    • Multiple sclerosis (CMS/HCC)      · Assessed By: Isaac Torres (Neurology); Last Assessed: 11 Apr 2013           The following portions of the patient's history were reviewed and updated as appropriate: allergies, current medications, past medical history, past surgical history and problem list.    Review of Systems   Constitutional: Positive for fatigue. Negative for activity change and unexpected weight change.   HENT: Negative for tinnitus and trouble swallowing.    Eyes: Negative for photophobia and visual disturbance.   Respiratory: Negative for apnea and choking.    Cardiovascular: Negative for leg swelling.   Endocrine: Positive for heat intolerance. Negative for cold intolerance.   Genitourinary: Negative for difficulty urinating, frequency, menstrual problem and urgency.   Musculoskeletal: Positive for gait problem. Negative for back pain.   Skin: Negative  "for color change.   Allergic/Immunologic: Negative for immunocompromised state.   Neurological: Positive for tremors and speech difficulty. Negative for dizziness, seizures, syncope, facial asymmetry and light-headedness.   Hematological: Negative for adenopathy. Does not bruise/bleed easily.   Psychiatric/Behavioral: Positive for decreased concentration. Negative for behavioral problems, confusion, hallucinations and sleep disturbance.        Objective:  Vitals:    12/13/18 1320   BP: 122/76   BP Location: Right arm   Patient Position: Sitting   Cuff Size: Adult   Pulse: 78   SpO2: 98%   Weight: 54.4 kg (120 lb)   Height: 165.1 cm (65\")       Neurologic Exam     Mental Status   Attention: normal. Concentration: normal.   Level of consciousness: alert  Knowledge: good and consistent with education.   Normal comprehension.     Cranial Nerves     CN II   Visual fields full to confrontation.   Visual acuity: normal  Right visual field deficit: none  Left visual field deficit: none     CN III, IV, VI   Nystagmus: none   Diplopia: none  Ophthalmoparesis: none  Upgaze: normal  Downgaze: normal  Conjugate gaze: present    CN V   Facial sensation intact.   Right corneal reflex: normal  Left corneal reflex: normal    CN VII   Right facial weakness: none  Left facial weakness: none    CN VIII   Hearing: intact    CN IX, X   Palate: symmetric  Right gag reflex: normal  Left gag reflex: normal    CN XI   Right sternocleidomastoid strength: normal  Left sternocleidomastoid strength: normal    CN XII   Tongue: not atrophic  Fasciculations: absent  Tongue deviation: none    Motor Exam   Muscle bulk: normal  Overall muscle tone: normal  Right arm tone: normal  Left arm tone: normal  Right leg tone: normal  Left leg tone: normal    Sensory Exam   Right arm light touch: normal  Left arm light touch: normal  Right leg light touch: decreased from knee  Left leg light touch: decreased from knee  Right arm pinprick: normal  Left arm " pinprick: normal  Right leg pinprick: decreased from knee  Left leg pinprick: decreased from knee    Gait, Coordination, and Reflexes     Gait  Gait: circumduction, shuffling and wide-based (right leg)    Coordination   Romberg: positive  Tandem walking coordination: abnormal    Tremor   Resting tremor: absent  Intention tremor: absent  Action tremor: absent    Reflexes   Reflexes 2+ except as noted.   Right patellar: 0  Left patellar: 0  Right achilles: 0  Left achilles: 0      Physical Exam   Constitutional: She appears well-developed and well-nourished.   Neurological: She has an abnormal Romberg Test and an abnormal Tandem Gait Test.   Reflex Scores:       Patellar reflexes are 0 on the right side and 0 on the left side.       Achilles reflexes are 0 on the right side and 0 on the left side.  Nursing note and vitals reviewed.    Orders Only on 05/07/2018   Component Date Value Ref Range Status   • WBC 05/07/2018 6.34  3.50 - 10.80 10E3/mm3 Final   • RBC 05/07/2018 4.11  3.89 - 5.14 10E6/mm3 Final   • Hemoglobin 05/07/2018 12.1  11.5 - 15.5 g/dL Final   • Hematocrit 05/07/2018 36.9  34.5 - 44.0 % Final   • MCV 05/07/2018 89.8  80.0 - 99.0 fL Final   • MCH 05/07/2018 29.4  27.0 - 31.0 pg Final   • MCHC 05/07/2018 32.8  32.0 - 36.0 g/dL Final   • RDW 05/07/2018 13.5  11.3 - 14.5 % Final   • Platelets 05/07/2018 248  150 - 450 10E3/mm3 Final   • Neutrophil Rel % 05/07/2018 60.4  41.0 - 71.0 % Final   • Lymphocyte Rel % 05/07/2018 28.4  24.0 - 44.0 % Final   • Monocyte Rel % 05/07/2018 7.6  0.0 - 12.0 % Final   • Eosinophil Rel % 05/07/2018 2.5  0.0 - 3.0 % Final   • Basophil Rel % 05/07/2018 1.1* 0.0 - 1.0 % Final   • Neutrophils Absolute 05/07/2018 3.83  1.50 - 8.30 10E3/mm3 Final   • Lymphocytes Absolute 05/07/2018 1.80  0.60 - 4.80 10E3/mm3 Final   • Monocytes Absolute 05/07/2018 0.48  0.00 - 1.00 10E3/mm3 Final   • Eosinophils Absolute 05/07/2018 0.16  0.10 - 0.30 10E3/mm3 Final   • Basophils Absolute 05/07/2018  0.07  0.00 - 0.20 10E3/mm3 Final   • Immature Granulocyte Rel % 05/07/2018 0.3  0.0 - 0.6 % Final   • Immature Grans Absolute 05/07/2018 0.02  0.00 - 0.03 10E3/mm3 Final   • Glucose 05/07/2018 81  70 - 100 mg/dL Final   • BUN 05/07/2018 14  9 - 23 mg/dL Final   • Creatinine 05/07/2018 0.60  0.60 - 1.30 mg/dL Final   • eGFR Non  Am 05/07/2018 97  >60 mL/min/1.73 Final    Comment:     --------------------------------------      [employer] Guidelines      Stage      Description           GFR      1          Normal or High        90+      2          Mild decrease         60-89      3          Moderate decrease     30-59      4          Severe decrease       15-29      5          Kidney failure        <15     • BUN/Creatinine Ratio 05/07/2018 23.3  7.0 - 25.0 Final   • Sodium 05/07/2018 145  132 - 146 mmol/L Final   • Potassium 05/07/2018 3.5  3.5 - 5.5 mmol/L Final   • Chloride 05/07/2018 110* 99 - 109 mmol/L Final   • Total CO2 05/07/2018 28.0  20.0 - 31.0 mmol/L Final   • Calcium 05/07/2018 8.7  8.7 - 10.4 mg/dL Final   • Total Protein 05/07/2018 5.8  5.7 - 8.2 g/dL Final   • Albumin 05/07/2018 3.90  3.20 - 4.80 g/dL Final   • Globulin 05/07/2018 1.9  g/dL Final   • A/G Ratio 05/07/2018 2.1  1.5 - 2.5 g/dL Final   • Total Bilirubin 05/07/2018 0.4  0.3 - 1.2 mg/dL Final   • Alkaline Phosphatase 05/07/2018 28  25 - 100 U/L Final   • AST (SGOT) 05/07/2018 21  0 - 33 U/L Final   • ALT (SGPT) 05/07/2018 18  7 - 40 U/L Final         Assessment/Plan:       Problems Addressed this Visit        Nervous and Auditory    Multiple sclerosis (CMS/HCC) - Primary     No new or worsening sx on Aubagio     CBC, CMP           Neuropathic pain    Polyneuropathy     EMG/NCS with sensorimotor polyneuropathy    Sx of N/B/T improved with Lyrica             Other    Depression     Psychological condition is unchanged.  Continue current treatment regimen.  Psychological condition  will be reassessed at the next regular  appointment.

## 2018-12-18 ENCOUNTER — RESULTS ENCOUNTER (OUTPATIENT)
Dept: NEUROLOGY | Facility: CLINIC | Age: 78
End: 2018-12-18

## 2018-12-18 DIAGNOSIS — G35 MULTIPLE SCLEROSIS (HCC): ICD-10-CM

## 2019-01-02 LAB
ALBUMIN SERPL-MCNC: 4.23 G/DL (ref 3.2–4.8)
ALBUMIN/GLOB SERPL: 2.3 G/DL (ref 1.5–2.5)
ALP SERPL-CCNC: 30 U/L (ref 25–100)
ALT SERPL-CCNC: 18 U/L (ref 7–40)
AST SERPL-CCNC: 21 U/L (ref 0–33)
BASOPHILS # BLD AUTO: 0.06 10E3/MM3 (ref 0–0.2)
BASOPHILS NFR BLD AUTO: 0.8 % (ref 0–1)
BILIRUB SERPL-MCNC: 0.4 MG/DL (ref 0.3–1.2)
BUN SERPL-MCNC: 10 MG/DL (ref 9–23)
BUN/CREAT SERPL: 14.9 (ref 7–25)
CALCIUM SERPL-MCNC: 9.6 MG/DL (ref 8.7–10.4)
CHLORIDE SERPL-SCNC: 108 MMOL/L (ref 99–109)
CO2 SERPL-SCNC: 27 MMOL/L (ref 20–31)
CREAT SERPL-MCNC: 0.67 MG/DL (ref 0.6–1.3)
EOSINOPHIL # BLD AUTO: 0.19 10E3/MM3 (ref 0–0.3)
EOSINOPHIL NFR BLD AUTO: 2.7 % (ref 0–3)
ERYTHROCYTE [DISTWIDTH] IN BLOOD BY AUTOMATED COUNT: 13.4 % (ref 11.3–14.5)
GLOBULIN SER CALC-MCNC: 1.9 G/DL
GLUCOSE SERPL-MCNC: 89 MG/DL (ref 70–100)
HCT VFR BLD AUTO: 39 % (ref 34.5–44)
HGB BLD-MCNC: 12.6 G/DL (ref 11.5–15.5)
IMM GRANULOCYTES # BLD: 0.01 10E3/MM3 (ref 0–0.03)
IMM GRANULOCYTES NFR BLD: 0.1 % (ref 0–0.6)
LYMPHOCYTES # BLD AUTO: 2.1 10E3/MM3 (ref 0.6–4.8)
LYMPHOCYTES NFR BLD AUTO: 29.4 % (ref 24–44)
MCH RBC QN AUTO: 29.8 PG (ref 27–31)
MCHC RBC AUTO-ENTMCNC: 32.3 G/DL (ref 32–36)
MCV RBC AUTO: 92.2 FL (ref 80–99)
MONOCYTES # BLD AUTO: 0.65 10E3/MM3 (ref 0–1)
MONOCYTES NFR BLD AUTO: 9.1 % (ref 0–12)
NEUTROPHILS # BLD AUTO: 4.15 10E3/MM3 (ref 1.5–8.3)
NEUTROPHILS NFR BLD AUTO: 58 % (ref 41–71)
PLATELET # BLD AUTO: 296 10E3/MM3 (ref 150–450)
POTASSIUM SERPL-SCNC: 3.9 MMOL/L (ref 3.5–5.5)
PROT SERPL-MCNC: 6.1 G/DL (ref 5.7–8.2)
RBC # BLD AUTO: 4.23 10E6/MM3 (ref 3.89–5.14)
SODIUM SERPL-SCNC: 143 MMOL/L (ref 132–146)
WBC # BLD AUTO: 7.15 10E3/MM3 (ref 3.5–10.8)

## 2019-03-24 RX ORDER — DALFAMPRIDINE 10 MG/1
TABLET, FILM COATED, EXTENDED RELEASE ORAL
Qty: 60 TABLET | Refills: 4 | Status: CANCELLED | OUTPATIENT
Start: 2019-03-24

## 2019-03-25 ENCOUNTER — SPECIALTY PHARMACY (OUTPATIENT)
Dept: ONCOLOGY | Facility: HOSPITAL | Age: 79
End: 2019-03-25

## 2019-03-25 RX ORDER — DALFAMPRIDINE 10 MG/1
10 TABLET, FILM COATED, EXTENDED RELEASE ORAL 2 TIMES DAILY
Qty: 60 TABLET | Refills: 11 | Status: SHIPPED | OUTPATIENT
Start: 2019-03-25 | End: 2020-01-01

## 2019-04-01 RX ORDER — RENAGEL 800 MG/1
TABLET ORAL
Qty: 28 TABLET | Refills: 10 | OUTPATIENT
Start: 2019-04-01

## 2019-06-27 ENCOUNTER — OFFICE VISIT (OUTPATIENT)
Dept: NEUROLOGY | Facility: CLINIC | Age: 79
End: 2019-06-27

## 2019-06-27 ENCOUNTER — LAB REQUISITION (OUTPATIENT)
Dept: LAB | Facility: HOSPITAL | Age: 79
End: 2019-06-27

## 2019-06-27 VITALS
SYSTOLIC BLOOD PRESSURE: 122 MMHG | DIASTOLIC BLOOD PRESSURE: 66 MMHG | HEART RATE: 80 BPM | OXYGEN SATURATION: 98 % | HEIGHT: 65 IN | WEIGHT: 120 LBS | RESPIRATION RATE: 16 BRPM | BODY MASS INDEX: 19.99 KG/M2

## 2019-06-27 DIAGNOSIS — G62.9 POLYNEUROPATHY: ICD-10-CM

## 2019-06-27 DIAGNOSIS — G35 MULTIPLE SCLEROSIS (HCC): Primary | ICD-10-CM

## 2019-06-27 DIAGNOSIS — F33.40 RECURRENT MAJOR DEPRESSIVE DISORDER, IN REMISSION (HCC): ICD-10-CM

## 2019-06-27 DIAGNOSIS — M79.2 NEUROPATHIC PAIN: ICD-10-CM

## 2019-06-27 DIAGNOSIS — Z00.00 ROUTINE GENERAL MEDICAL EXAMINATION AT A HEALTH CARE FACILITY: ICD-10-CM

## 2019-06-27 PROCEDURE — 99214 OFFICE O/P EST MOD 30 MIN: CPT | Performed by: PSYCHIATRY & NEUROLOGY

## 2019-06-27 NOTE — PROGRESS NOTES
Subjective:   Chief Complaint   Patient presents with   • Multiple Sclerosis       Patient ID: Roselia Lobo is a 78 y.o. female.    History of Present Illness     78 y.o.  woman with RRMS, MDD and neuropathic pain returns in follow up.  Last visit on 12/13/18 continued Aubagio, Celexa, Ampyra, and Lyrica, ordered labs.     12/13/18:  CBC,CMP - NCS    RRMS    Fell 12/2018 due to worsening balance.   Low back pain increased.      Fatigue is severe.      Tolerating Aubagio without side effects.     Ampyra improves balance and walking speed.     T25FW 12.8s sec    MDD    Mood stable  on Celexa.     Neuropathic Pain    No new or worsening sx.   Lyrica reduces burning pain legs. Moderate burning/tingling in legs.  Feet are burning on the bottom all the time.  Feet are red and discolored.      Past Medical History:   Diagnosis Date   • Depression    • Multiple sclerosis (CMS/HCC)      · Assessed By: Isaac Torres (Neurology); Last Assessed: 11 Apr 2013           The following portions of the patient's history were reviewed and updated as appropriate: allergies, current medications, past medical history, past surgical history and problem list.    Review of Systems   Constitutional: Positive for fatigue. Negative for activity change and unexpected weight change.   HENT: Negative for tinnitus and trouble swallowing.    Eyes: Negative for photophobia and visual disturbance.   Respiratory: Negative for apnea and choking.    Cardiovascular: Negative for leg swelling.   Endocrine: Positive for heat intolerance. Negative for cold intolerance.   Genitourinary: Negative for difficulty urinating, frequency, menstrual problem and urgency.   Musculoskeletal: Positive for gait problem. Negative for back pain.   Skin: Negative for color change.   Allergic/Immunologic: Negative for immunocompromised state.   Neurological: Positive for tremors and speech difficulty. Negative for dizziness, seizures, syncope, facial asymmetry and  "light-headedness.   Hematological: Negative for adenopathy. Does not bruise/bleed easily.   Psychiatric/Behavioral: Positive for decreased concentration. Negative for behavioral problems, confusion, hallucinations and sleep disturbance.        Objective:  Vitals:    06/27/19 1133   BP: 122/66   BP Location: Left arm   Patient Position: Sitting   Cuff Size: Adult   Pulse: 80   Resp: 16   SpO2: 98%   Weight: 54.4 kg (120 lb)   Height: 165.1 cm (65\")       Neurologic Exam     Mental Status   Attention: normal. Concentration: normal.   Level of consciousness: alert  Knowledge: good and consistent with education.   Normal comprehension.     Cranial Nerves     CN II   Visual fields full to confrontation.   Visual acuity: normal  Right visual field deficit: none  Left visual field deficit: none     CN III, IV, VI   Nystagmus: none   Diplopia: none  Ophthalmoparesis: none  Upgaze: normal  Downgaze: normal  Conjugate gaze: present    CN V   Facial sensation intact.   Right corneal reflex: normal  Left corneal reflex: normal    CN VII   Right facial weakness: none  Left facial weakness: none    CN VIII   Hearing: intact    CN IX, X   Palate: symmetric  Right gag reflex: normal  Left gag reflex: normal    CN XI   Right sternocleidomastoid strength: normal  Left sternocleidomastoid strength: normal    CN XII   Tongue: not atrophic  Fasciculations: absent  Tongue deviation: none    Motor Exam   Muscle bulk: normal  Overall muscle tone: normal  Right arm tone: normal  Left arm tone: normal  Right leg tone: normal  Left leg tone: normal    Strength   Right neck flexion: 5/5  Left neck flexion: 5/5  Right neck extension: 5/5  Left neck extension: 5/5  Right deltoid: 5/5  Left deltoid: 5/5  Right biceps: 5/5  Left biceps: 5/5  Right triceps: 5/5  Left triceps: 5/5  Right wrist flexion: 5/5  Left wrist flexion: 5/5  Right wrist extension: 5/5  Left wrist extension: 5/5  Right interossei: 5/5  Left interossei: 5/5  Right abdominals: " 5/5  Left abdominals: 5/5  Right iliopsoas: 5/5  Left iliopsoas: 5/5  Right quadriceps: 5/5  Left quadriceps: 5/5  Right hamstrin/5  Left hamstrin/5  Right glutei: 5/5  Left glutei: 5/5  Right anterior tibial: 5/5  Left anterior tibial: 5/5  Right posterior tibial: 5/5  Left posterior tibial: 5/5  Right peroneal: 5/5  Left peroneal: 5/5  Right gastroc: 5/5  Left gastroc: 5/5    Sensory Exam   Right arm light touch: normal  Left arm light touch: normal  Right leg light touch: decreased from knee  Left leg light touch: decreased from knee  Right arm pinprick: normal  Left arm pinprick: normal  Right leg pinprick: decreased from knee  Left leg pinprick: decreased from knee    Gait, Coordination, and Reflexes     Gait  Gait: circumduction, shuffling and wide-based (right leg)    Coordination   Romberg: positive  Tandem walking coordination: abnormal    Tremor   Resting tremor: absent  Intention tremor: absent  Action tremor: absent    Reflexes   Reflexes 2+ except as noted.   Right patellar: 0  Left patellar: 0  Right achilles: 0  Left achilles: 0      Physical Exam   Constitutional: She appears well-developed and well-nourished.   Neurological: She has an abnormal Romberg Test and an abnormal Tandem Gait Test.   Reflex Scores:       Patellar reflexes are 0 on the right side and 0 on the left side.       Achilles reflexes are 0 on the right side and 0 on the left side.  Nursing note and vitals reviewed.    Orders Only on 2018   Component Date Value Ref Range Status   • WBC 2018 7.15  3.50 - 10.80 10E3/mm3 Final   • RBC 2018 4.23  3.89 - 5.14 10E6/mm3 Final   • Hemoglobin 2018 12.6  11.5 - 15.5 g/dL Final   • Hematocrit 2018 39.0  34.5 - 44.0 % Final   • MCV 2018 92.2  80.0 - 99.0 fL Final   • MCH 2018 29.8  27.0 - 31.0 pg Final   • MCHC 2018 32.3  32.0 - 36.0 g/dL Final   • RDW 2018 13.4  11.3 - 14.5 % Final   • Platelets 2018 296  150 - 450 10E3/mm3  Final   • Neutrophil Rel % 12/13/2018 58.0  41.0 - 71.0 % Final   • Lymphocyte Rel % 12/13/2018 29.4  24.0 - 44.0 % Final   • Monocyte Rel % 12/13/2018 9.1  0.0 - 12.0 % Final   • Eosinophil Rel % 12/13/2018 2.7  0.0 - 3.0 % Final   • Basophil Rel % 12/13/2018 0.8  0.0 - 1.0 % Final   • Neutrophils Absolute 12/13/2018 4.15  1.50 - 8.30 10E3/mm3 Final   • Lymphocytes Absolute 12/13/2018 2.10  0.60 - 4.80 10E3/mm3 Final   • Monocytes Absolute 12/13/2018 0.65  0.00 - 1.00 10E3/mm3 Final   • Eosinophils Absolute 12/13/2018 0.19  0.00 - 0.30 10E3/mm3 Final   • Basophils Absolute 12/13/2018 0.06  0.00 - 0.20 10E3/mm3 Final   • Immature Granulocyte Rel % 12/13/2018 0.1  0.0 - 0.6 % Final   • Immature Grans Absolute 12/13/2018 0.01  0.00 - 0.03 10E3/mm3 Final   • Glucose 12/13/2018 89  70 - 100 mg/dL Final   • BUN 12/13/2018 10  9 - 23 mg/dL Final   • Creatinine 12/13/2018 0.67  0.60 - 1.30 mg/dL Final   • eGFR Non  Am 12/13/2018 85  >60 mL/min/1.73 Final    Comment:     [employer] Guidelines      Stage      Description           GFR      1          Normal or High        90+      2          Mild decrease         60-89      3          Moderate decrease     30-59      4          Severe decrease       15-29      5          Kidney failure        <15      The MDRD GFR formula is only valid for adults with stable      renal function between 18 and 70.     • BUN/Creatinine Ratio 12/13/2018 14.9  7.0 - 25.0 Final   • Sodium 12/13/2018 143  132 - 146 mmol/L Final   • Potassium 12/13/2018 3.9  3.5 - 5.5 mmol/L Final   • Chloride 12/13/2018 108  99 - 109 mmol/L Final   • Total CO2 12/13/2018 27.0  20.0 - 31.0 mmol/L Final   • Calcium 12/13/2018 9.6  8.7 - 10.4 mg/dL Final   • Total Protein 12/13/2018 6.1  5.7 - 8.2 g/dL Final   • Albumin 12/13/2018 4.23  3.20 - 4.80 g/dL Final   • Globulin 12/13/2018 1.9  g/dL Final   • A/G Ratio 12/13/2018 2.3  1.5 - 2.5 g/dL Final   • Total Bilirubin 12/13/2018 0.4  0.3 - 1.2  mg/dL Final   • Alkaline Phosphatase 12/13/2018 30  25 - 100 U/L Final   • AST (SGOT) 12/13/2018 21  0 - 33 U/L Final   • ALT (SGPT) 12/13/2018 18  7 - 40 U/L Final         Assessment/Plan:       Problems Addressed this Visit        Nervous and Auditory    Multiple sclerosis (CMS/HCC) - Primary     Worsening of balance since Dec 2018     MRI Brain    CBC,CMP    Refer to PT          Relevant Orders    CBC & Differential    Comprehensive Metabolic Panel    Ambulatory Referral to Physical Therapy Evaluate and treat    MRI Brain With & Without Contrast    Neuropathic pain     Sx controlled on Lyrica          Polyneuropathy     Balance worsening     Refer to PT             Other    Depression     Psychological condition is improving with treatment.  Continue current treatment regimen.  Psychological condition  will be reassessed at the next regular appointment.

## 2019-06-28 LAB
ALBUMIN SERPL-MCNC: 4.1 G/DL (ref 3.5–5.2)
ALBUMIN/GLOB SERPL: 1.9 G/DL
ALP SERPL-CCNC: 30 U/L (ref 39–117)
ALT SERPL-CCNC: 13 U/L (ref 1–33)
AST SERPL-CCNC: 20 U/L (ref 1–32)
BASOPHILS # BLD AUTO: 0.08 10*3/MM3 (ref 0–0.2)
BASOPHILS NFR BLD AUTO: 1.5 % (ref 0–1.5)
BILIRUB SERPL-MCNC: 0.4 MG/DL (ref 0.2–1.2)
BUN SERPL-MCNC: 13 MG/DL (ref 8–23)
BUN/CREAT SERPL: 18.3 (ref 7–25)
CALCIUM SERPL-MCNC: 9.8 MG/DL (ref 8.6–10.5)
CHLORIDE SERPL-SCNC: 105 MMOL/L (ref 98–107)
CO2 SERPL-SCNC: 27.6 MMOL/L (ref 22–29)
CREAT SERPL-MCNC: 0.71 MG/DL (ref 0.57–1)
EOSINOPHIL # BLD AUTO: 0.16 10*3/MM3 (ref 0–0.4)
EOSINOPHIL NFR BLD AUTO: 3.1 % (ref 0.3–6.2)
ERYTHROCYTE [DISTWIDTH] IN BLOOD BY AUTOMATED COUNT: 13.1 % (ref 12.3–15.4)
GLOBULIN SER CALC-MCNC: 2.2 GM/DL
GLUCOSE SERPL-MCNC: 84 MG/DL (ref 65–99)
HCT VFR BLD AUTO: 38.1 % (ref 34–46.6)
HGB BLD-MCNC: 11.8 G/DL (ref 12–15.9)
IMM GRANULOCYTES # BLD AUTO: 0.02 10*3/MM3 (ref 0–0.05)
IMM GRANULOCYTES NFR BLD AUTO: 0.4 % (ref 0–0.5)
LYMPHOCYTES # BLD AUTO: 1.59 10*3/MM3 (ref 0.7–3.1)
LYMPHOCYTES NFR BLD AUTO: 30.5 % (ref 19.6–45.3)
MCH RBC QN AUTO: 29.6 PG (ref 26.6–33)
MCHC RBC AUTO-ENTMCNC: 31 G/DL (ref 31.5–35.7)
MCV RBC AUTO: 95.5 FL (ref 79–97)
MONOCYTES # BLD AUTO: 0.41 10*3/MM3 (ref 0.1–0.9)
MONOCYTES NFR BLD AUTO: 7.9 % (ref 5–12)
NEUTROPHILS # BLD AUTO: 2.96 10*3/MM3 (ref 1.7–7)
NEUTROPHILS NFR BLD AUTO: 56.6 % (ref 42.7–76)
NRBC BLD AUTO-RTO: 0 /100 WBC (ref 0–0.2)
PLATELET # BLD AUTO: 257 10*3/MM3 (ref 140–450)
POTASSIUM SERPL-SCNC: 4.2 MMOL/L (ref 3.5–5.2)
PROT SERPL-MCNC: 6.3 G/DL (ref 6–8.5)
RBC # BLD AUTO: 3.99 10*6/MM3 (ref 3.77–5.28)
SODIUM SERPL-SCNC: 144 MMOL/L (ref 136–145)
WBC # BLD AUTO: 5.22 10*3/MM3 (ref 3.4–10.8)

## 2019-07-11 ENCOUNTER — HOSPITAL ENCOUNTER (OUTPATIENT)
Dept: PHYSICAL THERAPY | Facility: HOSPITAL | Age: 79
Setting detail: THERAPIES SERIES
Discharge: HOME | End: 2019-07-11

## 2019-07-11 ENCOUNTER — TRANSCRIBE ORDERS (OUTPATIENT)
Dept: PHYSICAL THERAPY | Facility: HOSPITAL | Age: 79
End: 2019-07-11

## 2019-07-11 DIAGNOSIS — R29.898 FINE MOTOR IMPAIRMENT: Primary | ICD-10-CM

## 2019-07-11 DIAGNOSIS — R26.9 GAIT DIFFICULTY: Primary | ICD-10-CM

## 2019-07-11 DIAGNOSIS — R29.818 FINE MOTOR IMPAIRMENT: Primary | ICD-10-CM

## 2019-07-11 DIAGNOSIS — R29.898 HAND WEAKNESS: ICD-10-CM

## 2019-07-11 DIAGNOSIS — R26.89 BALANCE PROBLEM: ICD-10-CM

## 2019-07-11 PROCEDURE — 97162 PT EVAL MOD COMPLEX 30 MIN: CPT | Performed by: PHYSICAL THERAPIST

## 2019-07-11 PROCEDURE — 97110 THERAPEUTIC EXERCISES: CPT | Performed by: PHYSICAL THERAPIST

## 2019-07-11 NOTE — THERAPY EVALUATION
.Outpatient Physical Therapy Neuro Initial Evaluation  [facility]     Patient Name: Roselia Lobo  : 1940  MRN: [id number]  Today's Date: 2019      Visit Date: 2019    Patient Active Problem List   Diagnosis   • Multiple sclerosis (CMS/HCC)   • Neuropathic pain   • Depression   • Polyneuropathy        Past Medical History:   Diagnosis Date   • Depression    • Multiple sclerosis (CMS/HCC)      · Assessed By: Isaac Torres (Neurology); Last Assessed: 2013        Past Surgical History:   Procedure Laterality Date   • HIP SURGERY           Visit Dx:     ICD-10-CM ICD-9-CM   1. Gait difficulty R26.9 781.2   2. Balance problem R26.89 781.99       Patient History     Row Name 19 1000             History    Chief Complaint  Difficulty Walking;Pain;Balance Problems;Falls/history of falls;Fatigue/poor endurance;Muscle weakness  -MW      Type of Pain  Lower Extremity / Leg B  -MW      Date Current Problem(s) Began  -- 1983 (MS diagnosis)  -MW      Brief Description of Current Complaint  Pt. reports that since last therapy sessions she has stayed the same in function but has had an increase in the number of falls.  Pt. would like to work on balance, walking and strength.  -MW      Patient/Caregiver Goals  Improve mobility;Improve strength  -MW      Current Tobacco Use  yes  -MW      Smoking Status  reduced the amount  -MW      Hand Dominance  right-handed  -MW      Occupation/sports/leisure activities  retired accounting work with [employer]  -MW         Pain     Pain Location  Leg below knees B  -MW      Pain at Present  5  -MW      Pain at Best  5  -MW      Pain at Worst  10  -MW      Pain Frequency  Constant/continuous  -MW      Pain Description  Burning;Tingling;Heaviness  -MW      What Performance Factors Make the Current Problem(s) WORSE?  doing too much  -MW      What Performance Factors Make the Current Problem(s) BETTER?  nothing noted  -MW      Is your sleep  "disturbed?  Yes  -MW      Is medication used to assist with sleep?  No  -MW      Difficulties with ADL's?  no  -MW         Fall Risk Assessment    Any falls in the past year:  Yes  -MW      Number of falls reported in the last 12 months  6  -MW      Factors that contributed to the fall:  Tripped;Lost balance  -MW      Does patient have a fear of falling  No  -MW         Daily Activities    Primary Language  English  -MW      Are you able to read  Yes  -MW      Are you able to write  Yes  -MW      Teaching needs identified  Home Exercise Program;Falls Prevention  -MW      Barriers to learning  None  -MW      Recommended Referrals  Occupational Therapy  -MW      Pt Participated in POC and Goals  Yes  -MW         Safety    Are you being hurt, hit, or frightened by anyone at home or in your life?  No  -MW      Are you being neglected by a caregiver  No  -MW        User Key  (r) = Recorded By, (t) = Taken By, (c) = Cosigned By    Initials Name Provider Type    MW Marj Reese, PT Physical Therapist              PT Neuro     Row Name 07/11/19 1000             Subjective Comments    Subjective Comments  \"I've lost about 50 lbs and my male friend passed away in February but I'm doing good.\"  -MW         Subjective Pain    Pre-Treatment Pain Level  5  -MW      Post-Treatment Pain Level  5  -MW         Home Living    Living Arrangements  house  -MW      Home Accessibility  stairs to enter home  -MW      Number of Stairs, Main Entrance  three  -MW      Stair Railings, Main Entrance  railing on right side (ascending)  -MW      Home Equipment  Rolling walker;Cane;Quad cane;Grab bars shower chair, grab bars  -MW      Living Environment Comment  lives alone; two boys live nearby  -MW         Vision-Basic Assessment    Current Vision  Wears glasses only for reading  -MW         Cognition    Overall Cognitive Status  WFL  -MW         Sensation    Light Touch  Partial deficits in the RLE;Partial deficits in the LLE  -MW      " Additional Comments  absent B below knees  -MW         Proprioception    Proprioception  absent R big toe  -MW         Posture/Observations    Posture/Observations Comments  lateral left trunk lean  -MW         Coordination    Coordination Tests  Rapid Alternating  -MW      Rapid Alternating  Bilteral:;Impaired UE/LE  -MW         General ROM    GENERAL ROM COMMENTS  B LEs: WNL  -MW         MMT (Manual Muscle Testing)    Rt Lower Ext  Rt Hip Flexion;Rt Hip Extension;Rt Hip ABduction;Rt Hip ADduction;Rt Knee Extension;Rt Knee Flexion;Rt Ankle Plantarflexion;Rt Ankle Dorsiflexion  -MW      Lt Lower Ext  Lt Hip Flexion;Lt Hip Extension;Lt Hip ABduction;Lt Hip ADduction;Lt Knee Extension;Lt Knee Flexion;Lt Ankle Plantarflexion;Lt Ankle Dorsiflexion  -MW         MMT Right Lower Ext    Rt Hip Flexion MMT, Gross Movement  (3-/5) fair minus  -MW      Rt Hip Extension MMT, Gross Movement  (3-/5) fair minus  -MW      Rt Hip ABduction MMT, Gross Movement  (3-/5) fair minus  -MW      Rt Hip ADduction MMT, Gross Movement  (3-/5) fair minus  -MW      Rt Knee Extension MMT, Gross Movement  (3+/5) fair plus  -MW      Rt Knee Flexion MMT, Gross Movement  (3+/5) fair plus  -MW      Rt Ankle Plantarflexion MMT, Gross Movement  (3-/5) fair minus  -MW      Rt Ankle Dorsiflexion MMT, Gross Movement  (3-/5) fair minus  -MW         MMT Left Lower Ext    Lt Hip Flexion MMT, Gross Movement  (3+/5) fair plus  -MW      Lt Hip Extension MMT, Gross Movement  (3+/5) fair plus  -MW      Lt Hip ABduction MMT, Gross Movement  (3+/5) fair plus  -MW      Lt Hip ADduction MMT, Gross Movement  (3+/5) fair plus  -MW      Lt Knee Extension MMT, Gross Movement  (3+/5) fair plus  -MW      Lt Knee Flexion MMT, Gross Movement  (3+/5) fair plus  -MW      Lt Ankle Plantarflexion MMT, Gross Movement  (3/5) fair  -MW      Lt Ankle Dorsiflexion MMT, Gross Movement  (3/5) fair  -MW         Bed Mobility Assessment/Treatment    Comment (Bed Mobility)  mod I  -MW          Transfers    Bed-Chair [place] (Transfers)  supervision  -MW      Chair-Bed [place] (Transfers)  supervision  -MW      Assistive Device (Bed-Chair Transfers)  walker, front-wheeled  -MW      Sit-Stand [place] (Transfers)  supervision B UE A  -MW      Stand-Sit [place] (Transfers)  supervision B UE A  -MW      Comment (Transfers)  Pt sometimes takes several attempts to stand up using B UE's and occasionally momentum  -MW         Gait/Stairs Assessment/Training    [place] Level (Gait)  contact guard  -MW      Assistive Device (Gait)  -- none for testing; pt uses RW at home  -MW      Distance in Feet (Gait)  100 x 8 seated rest breaks in between  -MW      Bilateral Gait Deviations  foot drop/toe drag;forward flexed posture staggering gait, occasional scissoring  -MW      Left Sided Gait Deviations  leans left  -MW      Handrail Location (Stairs)  right side (ascending) pt uses B UE's to pull up occasionally  -MW      Number of Steps (Stairs)  12  -MW      Ascending Technique (Stairs)  step-over-step  -MW      Descending Technique (Stairs)  step-over-step  -MW        User Key  (r) = Recorded By, (t) = Taken By, (c) = Cosigned By    Initials Name Provider Type    Marj Jones, PT Physical Therapist                  Therapy Education  Given: HEP, Symptoms/condition management  Program: New  How Provided: Verbal, Demonstration, Written  Provided to: Patient  Level of Understanding: Verbalized, Demonstrated    PT OP Goals     Row Name 07/11/19 1000          PT Short Term Goals    STG Date to Achieve  08/29/19  -MW     STG 1  Patient to improve NAVAS balance score to >/= 25/56 to decrease client's risk of falls.  -MW     STG 1 Progress  New  -MW     STG 2  Patient to perform TUG within 17 sec without LOB for improved functional mobility.  -MW     STG 2 Progress  New  -MW     STG 3  Patient to ambulate 10 meters without AD within 15 sec without LOB for improved gait nadeem and functional  mobility.  -MW     STG 3 Progress  New  -MW     STG 4  Pt. to perform 2 sit to stands within 30 seconds without UE A for improved functional mobility.    -MW     STG 4 Progress  New  -MW     STG 5  Patient to improve FGA score to >/= 11/30 to decrease client's risk of falls.  -MW     STG 5 Progress  New  -MW        Long Term Goals    LTG Date to Achieve  10/17/19  -MW     LTG 1  Patient to improve NAVAS balance score to >/= 38/56 to decrease client's risk of falls.  -MW     LTG 1 Progress  New  -MW     LTG 2  Patient to perform TUG within 15 sec without LOB for improved functional mobility.  -MW     LTG 2 Progress  New  -MW     LTG 3  Patient to ambulate 10 meters without AD within 14 sec without LOB for improved gait nadeem and functional mobility.  -MW     LTG 3 Progress  New  -MW     LTG 4  Pt. to perform 3 sit to stands within 30 seconds without UE A for improved functional mobility.    -MW     LTG 4 Progress  New  -MW     LTG 5  Patient to improve FGA score to >/= 17/30 to decrease client's risk of falls.  -MW     LTG 5 Progress  New  -MW     LTG 6  Pt. to be I with HEP.  -MW     LTG 6 Progress  New  -MW        Time Calculation    PT Goal Re-Cert Due Date  10/09/19  -       User Key  (r) = Recorded By, (t) = Taken By, (c) = Cosigned By    Initials Name Provider Type    Marj Jones, PT Physical Therapist          PT Assessment/Plan     Row Name 07/11/19 1000          PT Assessment    Functional Limitations  Decreased safety during functional activities;Impaired gait;Impaired locomotion;Limitations in community activities;Performance in leisure activities  -     Impairments  Balance;Muscle strength;Pain;Endurance;Impaired muscle endurance  -     Assessment Comments  Pt. presents with evolving symptoms following MS diagnosis.  Pt. to benefit from skilled PT services to improve gait, balance, strength, transfers and overall functional mobility.  Pt's current RW has fixed wheels and a bent back leg.   "Educated pt on getting new swivel wheet RW and had MD write an order.  Pt. to benefit from skilled OT services to improve fine motor skills and B UE arm/hand strength.  Pt. had fall with L UE impairement and will benefit from OT examination.  -MW     Please refer to paper survey for additional self-reported information  Yes  -MW     Rehab Potential  Good  -MW     Patient/caregiver participated in establishment of treatment plan and goals  Yes  -MW     Patient would benefit from skilled therapy intervention  Yes  -MW        PT Plan    PT Frequency  1x/week  -MW     Predicted Duration of Therapy Intervention (Therapy Eval)  14 visits  -MW     Planned CPT's?  PT EVAL MOD COMPLELITY: 12264;PT THER PROC EA 15 MIN: 26768;PT THER ACT EA 15 MIN: 69801;PT NEUROMUSC RE-EDUCATION EA 15 MIN: 84731;PT GAIT TRAINING EA 15 MIN: 13606;PT ELECTRICAL STIM UNATTEND: ;PT ELECTRICAL STIM ATTD EA 15 MIN: 78927  -MW     PT Plan Comments  PT services 1x/wk for 14 visits with emphasis on gait, balance, strength, transfers and overall functional mobility.  -MW       User Key  (r) = Recorded By, (t) = Taken By, (c) = Cosigned By    Initials Name Provider Type    Marj Jones, PT Physical Therapist             Exercises     Row Name 07/11/19 1000             Subjective Comments    Subjective Comments  \"I've lost about 50 lbs and my male friend passed away in February but I'm doing good.\"  -MW         Subjective Pain    Able to rate subjective pain?  yes  -MW      Pre-Treatment Pain Level  5  -MW      Post-Treatment Pain Level  5  -MW         Total Minutes    97930 - PT Therapeutic Exercise Minutes  10  -MW         Exercise 1    Exercise Name 1  Issued and performed HEP, see for details.  -MW      Time 1  10 min  -MW        User Key  (r) = Recorded By, (t) = Taken By, (c) = Cosigned By    Initials Name Provider Type    Marj Jones, PT Physical Therapist                      Outcome Measure Options: 10 Meter Walk, Lama " Balance, Timed Up and Go (TUG), FGA (Functional Gait Assessment), 30 Second Chair Stand Test  Gait, Assistive Device: other (see comments)(CGA)  10 Meter Walk Test Self-Selected Velocity  Self-Selected Velocity: Trial 1: 16.94 sec.  10 Meter Walk Test Fast Velocity  10 Meter Walk Fast Velocity: Trial 1: 14.35 sec.  30 Second Chair Stand Test  30 Second Chair Stand Test: 0  Lama Balance Scale  Sitting to Standing: able to stand using hands after several tries  Standing Unsupported: needs several tries to stand 30 seconds unsupported  Sitting with Back Unsupported but Feet Supported on Floor or on Stool: able to sit safely and securely for 2 minutes  Standing to Sitting: sits independently but has uncontrolled descent  Transfers: able to transfer with verbal cuing and/or supervision  Standing Unsupported with Eyes Closed: able to stand 3 seconds  Standing Unsupported with Feet Together: needs help to attain position and unable to hold for 15 seconds  Reaching Forward with Outstretched Arm While Standing: reaches forward but needs supervision   Object From the Floor From a Standing Position: able to  object but needs supervision  Turning to Look Behind Over Left and Right Shoulders While Standing: needs assist to keep from losing balance or falling  Turn 360 Degrees: needs assistance while turning  Place Alternate Foot on Step or Stool While Standing Unsupported: needs assistance to keep from falling/unable to try  Standing Unsupported with One Foot in Front: loses balance while stepping or standing  Standing on One Leg: unable to try of needs assist to prevent fall  Lama Total Score: 16  Functional Gait Assessment (FGA)  Gait Level Surface: Moderate Impairment  Change in Gait Speed: Severe Impairment  Gait with Horizontal Head Turns: Severe Impairment  Gait with Vertical Head Turns: Severe Impairment  Gait and Pivot Turn: Severe Impairment  Step Over Obstacle: Severe Impairment  Gait with Narrow Base of  Support: Severe Impairment  Gait with Eyes Closed: Severe Impairment  Ambulating Backwards: Moderate Impairment  Steps: Mild Impairment  FGA Total Score: 4  Timed Up and Go (TUG)  TUG Test 1: 19.57 seconds(CGA)    Time Calculation:   Start Time: 1050   Therapy Charges for Today     Code Description Service Date Service Provider Modifiers Qty    22553204824  PT THER PROC EA 15 MIN 7/11/2019 Marj Reese, PT GP 1    [id number]  PT EVAL MOD COMPLEXITY 4 7/11/2019 Marj Reese, PT GP 1          PT G-Codes  Outcome Measure Options: 10 Meter Walk, Lama Balance, Timed Up and Go (TUG), FGA (Functional Gait Assessment), 30 Second Chair Stand Test  Lama Total Score: 16  FGA Total Score: 4  TUG Test 1: 19.57 seconds(CGA)         Marj Reese, PT  7/11/2019

## 2019-07-18 ENCOUNTER — SPECIALTY PHARMACY (OUTPATIENT)
Dept: ONCOLOGY | Facility: HOSPITAL | Age: 79
End: 2019-07-18

## 2019-07-19 ENCOUNTER — HOSPITAL ENCOUNTER (OUTPATIENT)
Dept: PHYSICAL THERAPY | Facility: HOSPITAL | Age: 79
Setting detail: THERAPIES SERIES
Discharge: HOME | End: 2019-07-19

## 2019-07-19 DIAGNOSIS — R26.89 BALANCE PROBLEM: ICD-10-CM

## 2019-07-19 DIAGNOSIS — R26.9 GAIT DIFFICULTY: Primary | ICD-10-CM

## 2019-07-19 PROCEDURE — 97110 THERAPEUTIC EXERCISES: CPT | Performed by: PHYSICAL THERAPIST

## 2019-07-19 PROCEDURE — 97112 NEUROMUSCULAR REEDUCATION: CPT | Performed by: PHYSICAL THERAPIST

## 2019-07-19 NOTE — THERAPY TREATMENT NOTE
"    Outpatient Physical Therapy Neuro Treatment Note  [facility]     Patient Name: Roselia Lobo  : 1940  MRN: [id number]  Today's Date: 2019      Visit Date: 2019    Visit Dx:    ICD-10-CM ICD-9-CM   1. Gait difficulty R26.9 781.2   2. Balance problem R26.89 781.99       Patient Active Problem List   Diagnosis   • Multiple sclerosis (CMS/HCC)   • Neuropathic pain   • Depression   • Polyneuropathy           PT Neuro     Row Name 19 1100             Subjective Pain    Able to rate subjective pain?  yes  -MW      Pre-Treatment Pain Level  9  -MW         Balance Skills Training    Training Strategies (Balance)  Tall kneeling with B cerivcal rot/flex/ext x 10 with min A and LOB 20 % of the time.  Crawling fwd/bwd with CGA and VCing to increasea B step length.  Standing posture with pt leaning left but is able to self correct with VCing.  -        User Key  (r) = Recorded By, (t) = Taken By, (c) = Cosigned By    Initials Name Provider Type    Marj Jones, PT Physical Therapist                  PT Assessment/Plan     Row Name 19 1100          PT Assessment    Assessment Comments  Pt. requires min A with tall kneeling balance and VCing for more upright, centered posture.  Issued HEP and educated pt to perform to her tolerance.  -        PT Plan    PT Plan Comments  Continue with PT services to improve gait, balance, strength, transfers and overall functional mobility.  -MW       User Key  (r) = Recorded By, (t) = Taken By, (c) = Cosigned By    Initials Name Provider Type    Marj Jones, PT Physical Therapist             Exercises     Row Name 19 1100             Subjective Comments    Subjective Comments  \"I think I had a fall this past week, I don't remember.  I'm doing the exercises and they are hard.  My legs hurt real bad today but I didnt take my medicine for a couple of days.  I started to take it again yesterday.\"  -MW         Subjective Pain    Able to " rate subjective pain?  yes  -MW      Pre-Treatment Pain Level  9  -MW      Post-Treatment Pain Level  9  -MW         Total Minutes    22193 - PT Therapeutic Exercise Minutes  30  -MW      82755 -  PT Neuromuscular Reeducation Minutes  15  -MW         Exercise 1    Exercise Name 1  NuStep L5  -MW      Time 1  10 min  -MW      Additional Comments  B UE/LEs  -MW         Exercise 2    Exercise Name 2  Issued and performed HEP, see for details.  -MW      Time 2  20 min  -MW        User Key  (r) = Recorded By, (t) = Taken By, (c) = Cosigned By    Initials Name Provider Type    Marj Jones, PT Physical Therapist                          Therapy Education  Given: Posture/body mechanics, Mobility training, HEP  Program: New  How Provided: Verbal, Demonstration, Written  Provided to: Patient  Level of Understanding: Verbalized, Demonstrated              Time Calculation:   Start Time: 1100   Therapy Charges for Today     Code Description Service Date Service Provider Modifiers Qty    83350018084  PT NEUROMUSC RE EDUCATION EA 15 MIN 7/19/2019 Marj Reese, PT GP 1    [id number]  PT THER PROC EA 15 MIN 7/19/2019 Marj Reese, PT GP 2                    Marj Reese, PT  7/19/2019

## 2019-07-26 ENCOUNTER — HOSPITAL ENCOUNTER (OUTPATIENT)
Dept: PHYSICAL THERAPY | Facility: HOSPITAL | Age: 79
Setting detail: THERAPIES SERIES
Discharge: HOME | End: 2019-07-26

## 2019-07-26 DIAGNOSIS — R26.89 BALANCE PROBLEM: ICD-10-CM

## 2019-07-26 DIAGNOSIS — R26.9 GAIT DIFFICULTY: Primary | ICD-10-CM

## 2019-07-26 PROCEDURE — 97112 NEUROMUSCULAR REEDUCATION: CPT | Performed by: PHYSICAL THERAPIST

## 2019-07-26 PROCEDURE — 97110 THERAPEUTIC EXERCISES: CPT | Performed by: PHYSICAL THERAPIST

## 2019-07-26 NOTE — THERAPY TREATMENT NOTE
"    Outpatient Physical Therapy Neuro Treatment Note  [facility]     Patient Name: Roselia Lobo  : 1940  MRN: [id number]  Today's Date: 2019      Visit Date: 2019    Visit Dx:    ICD-10-CM ICD-9-CM   1. Gait difficulty R26.9 781.2   2. Balance problem R26.89 781.99       Patient Active Problem List   Diagnosis   • Multiple sclerosis (CMS/HCC)   • Neuropathic pain   • Depression   • Polyneuropathy           PT Neuro     Row Name 19 1000             Subjective Comments    Subjective Comments  \"I had a fall yesterday and I tried to avoid hitting the couch and I pulled my head back quick so now my neck hurts.\"  -MW         Subjective Pain    Able to rate subjective pain?  yes  -MW      Pre-Treatment Pain Level  5  -MW      Post-Treatment Pain Level  2  -MW      Subjective Pain Comment  neck pain  -MW         Balance Skills Training    Training Strategies (Balance)  Standing balance with alternating tapping blue foam x 10, one foot blue foam and hold up to 10 sec with min/mod A with LOB up to 60% of the time.    -MW        User Key  (r) = Recorded By, (t) = Taken By, (c) = Cosigned By    Initials Name Provider Type    Marj Jones, PT Physical Therapist                  PT Assessment/Plan     Row Name 19 1000          PT Assessment    Assessment Comments  Pt. requries min/mod A with standing balance activities.  Pt. demonstrates LOB without UE A with any dynamic movements.  Pt. requries rest breaks secondary to fatigue.  Pt. to benefit from skilled PT services to improve gait, balance, strength, transfers and overall functional mobility.  -MW        PT Plan    PT Plan Comments  Continue with PT services to improve gait, balance, strength, transfers and overall functional mobility.  -MW       User Key  (r) = Recorded By, (t) = Taken By, (c) = Cosigned By    Initials Name Provider Type    Marj Jones, PT Physical Therapist             Exercises     Row Name 19 1000 " "            Subjective Comments    Subjective Comments  \"I had a fall yesterday and I tried to avoid hitting the couch and I pulled my head back quick so now my neck hurts.\"  -MW         Subjective Pain    Able to rate subjective pain?  yes  -MW      Pre-Treatment Pain Level  5  -MW      Post-Treatment Pain Level  2  -MW      Subjective Pain Comment  neck pain  -MW         Total Minutes    70144 - PT Therapeutic Exercise Minutes  45  -MW      93407 -  PT Neuromuscular Reeducation Minutes  10  -MW         Exercise 1    Exercise Name 1  NuStep L5  -MW      Time 1  10 min  -MW      Additional Comments  B UE/LEs  -MW         Exercise 2    Exercise Name 2  Supine SKC with emphasis on control  -MW      Sets 2  1  -MW      Reps 2  10  -MW         Exercise 3    Exercise Name 3  Supine bridge with B LEs on large swiss ball   -MW      Sets 3  1  -MW      Reps 3  10  -MW         Exercise 4    Exercise Name 4  Prone B knee flexion  -MW      Sets 4  1  -MW      Reps 4  10  -MW         Exercise 5    Exercise Name 5  Prone mod planks  -MW      Sets 5  2  -MW      Reps 5  10  -MW         Exercise 6    Exercise Name 6  B sidelying hip abduction  -MW      Sets 6  1  -MW      Reps 6  10  -MW         Exercise 7    Exercise Name 7  B clam shells sidelying  -MW      Sets 7  1  -MW      Reps 7  10  -MW         Exercise 8    Exercise Name 8  Supine cervical stretching with B rot. lateral flex with 30 sec hold  -MW      Sets 8  1  -MW        User Key  (r) = Recorded By, (t) = Taken By, (c) = Cosigned By    Initials Name Provider Type    Marj Jones, PT Physical Therapist                          Therapy Education  Given: Symptoms/condition management, Posture/body mechanics  Program: Reinforced  How Provided: Verbal, Demonstration  Provided to: Patient  Level of Understanding: Verbalized, Demonstrated              Time Calculation:   Start Time: 1050   Therapy Charges for Today     Code Description Service Date Service Provider " Modifiers Qty    54936578481 HC PT NEUROMUSC RE EDUCATION EA 15 MIN 7/26/2019 Marj Reese, PT GP 1    43022743435 HC PT THER PROC EA 15 MIN 7/26/2019 Marj Reese, PT GP 3                    Marj Reese, PT  7/26/2019

## 2019-08-06 ENCOUNTER — OFFICE VISIT (OUTPATIENT)
Dept: NEUROLOGY | Facility: CLINIC | Age: 79
End: 2019-08-06

## 2019-08-06 VITALS
HEART RATE: 84 BPM | WEIGHT: 122 LBS | OXYGEN SATURATION: 98 % | HEIGHT: 65 IN | DIASTOLIC BLOOD PRESSURE: 70 MMHG | BODY MASS INDEX: 20.33 KG/M2 | SYSTOLIC BLOOD PRESSURE: 144 MMHG | RESPIRATION RATE: 18 BRPM

## 2019-08-06 DIAGNOSIS — M79.2 NEUROPATHIC PAIN: ICD-10-CM

## 2019-08-06 DIAGNOSIS — F33.40 RECURRENT MAJOR DEPRESSIVE DISORDER, IN REMISSION (HCC): ICD-10-CM

## 2019-08-06 DIAGNOSIS — G35 MULTIPLE SCLEROSIS (HCC): Primary | ICD-10-CM

## 2019-08-06 PROCEDURE — 99214 OFFICE O/P EST MOD 30 MIN: CPT | Performed by: PSYCHIATRY & NEUROLOGY

## 2019-08-06 NOTE — PROGRESS NOTES
Subjective:   Chief Complaint   Patient presents with   • Multiple Sclerosis       Patient ID: Roselia Lobo is a 79 y.o. female.    History of Present Illness     79 y.o.  woman with RRMS, MDD and neuropathic pain returns in follow up.  Last visit on 6/27/19 continued Aubagio, Celexa, Ampyra, and Lyrica, ordered MRI Brain, labs and PT.     6/27/19:  CBC,CMP - NCS    MRI Brain  7/8/19 as compared to 5/11/18 no new/enlarging/enhancing lesions, moderate lesion load, incidental right para falcine meningioma and intraosseous hemangioma right parietal bone at the vertex posteriorly    RRMS    Denies further falls using rolling walker.  Continues in  PT at [facility]     Fatigue is severe.      Tolerating Aubagio without side effects.     Ampyra improves balance and walking speed.     T25FW 12.8 sec    MDD    Mood stable on Celexa.     Neuropathic Pain    No new or worsening sx.  Lyrica improving burning in legs.            Past Medical History:   Diagnosis Date   • Depression    • Multiple sclerosis (CMS/HCC)      · Assessed By: Isaac Torres (Neurology); Last Assessed: 11 Apr 2013           The following portions of the patient's history were reviewed and updated as appropriate: allergies, current medications, past medical history, past surgical history and problem list.    Review of Systems   Constitutional: Positive for fatigue. Negative for activity change and unexpected weight change.   HENT: Negative for tinnitus and trouble swallowing.    Eyes: Negative for photophobia and visual disturbance.   Respiratory: Negative for apnea and choking.    Cardiovascular: Negative for leg swelling.   Endocrine: Positive for heat intolerance. Negative for cold intolerance.   Genitourinary: Negative for difficulty urinating, frequency, menstrual problem and urgency.   Musculoskeletal: Positive for gait problem. Negative for back pain.   Skin: Negative for color change.   Allergic/Immunologic: Negative for immunocompromised state.  "  Neurological: Positive for tremors and speech difficulty. Negative for dizziness, seizures, syncope, facial asymmetry and light-headedness.   Hematological: Negative for adenopathy. Does not bruise/bleed easily.   Psychiatric/Behavioral: Positive for decreased concentration. Negative for behavioral problems, confusion, hallucinations and sleep disturbance.        Objective:  Vitals:    08/06/19 1134   BP: 144/70   BP Location: Left arm   Patient Position: Sitting   Cuff Size: Adult   Pulse: 84   Resp: 18   SpO2: 98%   Weight: 55.3 kg (122 lb)   Height: 165.1 cm (65\")       Neurologic Exam     Mental Status   Attention: normal. Concentration: normal.   Level of consciousness: alert  Knowledge: good and consistent with education.   Normal comprehension.     Cranial Nerves     CN II   Visual fields full to confrontation.   Visual acuity: normal  Right visual field deficit: none  Left visual field deficit: none     CN III, IV, VI   Nystagmus: none   Diplopia: none  Ophthalmoparesis: none  Upgaze: normal  Downgaze: normal  Conjugate gaze: present    CN V   Facial sensation intact.   Right corneal reflex: normal  Left corneal reflex: normal    CN VII   Right facial weakness: none  Left facial weakness: none    CN VIII   Hearing: intact    CN IX, X   Palate: symmetric  Right gag reflex: normal  Left gag reflex: normal    CN XI   Right sternocleidomastoid strength: normal  Left sternocleidomastoid strength: normal    CN XII   Tongue: not atrophic  Fasciculations: absent  Tongue deviation: none    Motor Exam   Muscle bulk: normal  Overall muscle tone: normal  Right arm tone: normal  Left arm tone: normal  Right leg tone: normal  Left leg tone: normal    Strength   Right neck flexion: 5/5  Left neck flexion: 5/5  Right neck extension: 5/5  Left neck extension: 5/5  Right deltoid: 5/5  Left deltoid: 5/5  Right biceps: 5/5  Left biceps: 5/5  Right triceps: 5/5  Left triceps: 5/5  Right wrist flexion: 5/5  Left wrist flexion: " 5/5  Right wrist extension: 5/5  Left wrist extension: 5/5  Right interossei: 5/5  Left interossei: 5/5  Right abdominals: 5/5  Left abdominals: 5/5  Right iliopsoas: 5/  Left iliopsoas: 5/  Right quadriceps: 5  Left quadriceps: 5  Right hamstrin  Left hamstrin  Right glutei: 5  Left glutei: 5/  Right anterior tibial: 5  Left anterior tibial: 5  Right posterior tibial: 5/  Left posterior tibial: 5/  Right peroneal: 5  Left peroneal: 5  Right gastroc: 5  Left gastroc: 5    Sensory Exam   Right arm light touch: normal  Left arm light touch: normal  Right leg light touch: decreased from knee  Left leg light touch: decreased from knee  Right arm pinprick: normal  Left arm pinprick: normal  Right leg pinprick: decreased from knee  Left leg pinprick: decreased from knee    Gait, Coordination, and Reflexes     Gait  Gait: circumduction, shuffling and wide-based (right leg)    Coordination   Romberg: positive  Tandem walking coordination: abnormal    Tremor   Resting tremor: absent  Intention tremor: absent  Action tremor: absent    Reflexes   Reflexes 2+ except as noted.   Right patellar: 0  Left patellar: 0  Right achilles: 0  Left achilles: 0      Physical Exam   Constitutional: She appears well-developed and well-nourished.   Neurological: She has an abnormal Romberg Test and an abnormal Tandem Gait Test.   Reflex Scores:       Patellar reflexes are 0 on the right side and 0 on the left side.       Achilles reflexes are 0 on the right side and 0 on the left side.  Nursing note and vitals reviewed.    Office Visit on 2019   Component Date Value Ref Range Status   • WBC 2019 5.22  3.40 - 10.80 10*3/mm3 Final   • RBC 2019 3.99  3.77 - 5.28 10*6/mm3 Final   • Hemoglobin 2019 11.8* 12.0 - 15.9 g/dL Final   • Hematocrit 2019 38.1  34.0 - 46.6 % Final   • MCV 2019 95.5  79.0 - 97.0 fL Final   • MCH 2019 29.6  26.6 - 33.0 pg Final   • MCHC 2019  31.0* 31.5 - 35.7 g/dL Final   • RDW 06/27/2019 13.1  12.3 - 15.4 % Final   • Platelets 06/27/2019 257  140 - 450 10*3/mm3 Final   • Neutrophil Rel % 06/27/2019 56.6  42.7 - 76.0 % Final   • Lymphocyte Rel % 06/27/2019 30.5  19.6 - 45.3 % Final   • Monocyte Rel % 06/27/2019 7.9  5.0 - 12.0 % Final   • Eosinophil Rel % 06/27/2019 3.1  0.3 - 6.2 % Final   • Basophil Rel % 06/27/2019 1.5  0.0 - 1.5 % Final   • Neutrophils Absolute 06/27/2019 2.96  1.70 - 7.00 10*3/mm3 Final   • Lymphocytes Absolute 06/27/2019 1.59  0.70 - 3.10 10*3/mm3 Final   • Monocytes Absolute 06/27/2019 0.41  0.10 - 0.90 10*3/mm3 Final   • Eosinophils Absolute 06/27/2019 0.16  0.00 - 0.40 10*3/mm3 Final   • Basophils Absolute 06/27/2019 0.08  0.00 - 0.20 10*3/mm3 Final   • Immature Granulocyte Rel % 06/27/2019 0.4  0.0 - 0.5 % Final   • Immature Grans Absolute 06/27/2019 0.02  0.00 - 0.05 10*3/mm3 Final   • nRBC 06/27/2019 0.0  0.0 - 0.2 /100 WBC Final   • Glucose 06/27/2019 84  65 - 99 mg/dL Final   • BUN 06/27/2019 13  8 - 23 mg/dL Final   • Creatinine 06/27/2019 0.71  0.57 - 1.00 mg/dL Final   • eGFR Non African Am 06/27/2019 80  >60 mL/min/1.73 Final    Comment: The MDRD GFR formula is only valid for adults with stable  renal function between ages 18 and 70.     • eGFR  Am 06/27/2019 97  >60 mL/min/1.73 Final   • BUN/Creatinine Ratio 06/27/2019 18.3  7.0 - 25.0 Final   • Sodium 06/27/2019 144  136 - 145 mmol/L Final   • Potassium 06/27/2019 4.2  3.5 - 5.2 mmol/L Final   • Chloride 06/27/2019 105  98 - 107 mmol/L Final   • Total CO2 06/27/2019 27.6  22.0 - 29.0 mmol/L Final   • Calcium 06/27/2019 9.8  8.6 - 10.5 mg/dL Final   • Total Protein 06/27/2019 6.3  6.0 - 8.5 g/dL Final   • Albumin 06/27/2019 4.10  3.50 - 5.20 g/dL Final   • Globulin 06/27/2019 2.2  gm/dL Final   • A/G Ratio 06/27/2019 1.9  g/dL Final   • Total Bilirubin 06/27/2019 0.4  0.2 - 1.2 mg/dL Final   • Alkaline Phosphatase 06/27/2019 30* 39 - 117 U/L Final   • AST  (SGOT) 06/27/2019 20  1 - 32 U/L Final   • ALT (SGPT) 06/27/2019 13  1 - 33 U/L Final         Assessment/Plan:       Problems Addressed this Visit        Nervous and Auditory    Multiple sclerosis (CMS/HCC) - Primary     MRI brain stable on Aubagio          Neuropathic pain     Continue Lyrica             Other    Depression     Psychological condition is improving with treatment.  Continue current treatment regimen.  Psychological condition  will be reassessed at the next regular appointment.

## 2019-08-07 ENCOUNTER — HOSPITAL ENCOUNTER (OUTPATIENT)
Dept: OCCUPATIONAL THERAPY | Facility: HOSPITAL | Age: 79
Setting detail: THERAPIES SERIES
Discharge: HOME | End: 2019-08-07

## 2019-08-07 DIAGNOSIS — Z74.09 DECREASED FUNCTIONAL MOBILITY AND ENDURANCE: ICD-10-CM

## 2019-08-07 DIAGNOSIS — Z78.9 DECREASED INDEPENDENCE WITH ACTIVITIES OF DAILY LIVING: Primary | ICD-10-CM

## 2019-08-07 PROCEDURE — 97166 OT EVAL MOD COMPLEX 45 MIN: CPT | Performed by: OCCUPATIONAL THERAPIST

## 2019-08-07 NOTE — THERAPY EVALUATION
Outpatient Occupational Therapy Neuro Initial Evaluation  [facility]     Patient Name: Roselia Lobo  : 1940  MRN: [id number]  Today's Date: 2019      Visit Date: 2019    Patient Active Problem List   Diagnosis   • Multiple sclerosis (CMS/HCC)   • Neuropathic pain   • Depression   • Polyneuropathy        Past Medical History:   Diagnosis Date   • Depression    • Multiple sclerosis (CMS/HCC)      · Assessed By: Isaac Torres (Neurology); Last Assessed: 2013        Past Surgical History:   Procedure Laterality Date   • HIP SURGERY           Visit Dx:      ICD-10-CM ICD-9-CM   1. Decreased independence with activities of daily living Z65.8 V62.89   2. Decreased functional mobility and endurance Z74.09 780.99       Patient History     Row Name 19 1430             History    Chief Complaint  Pain;Balance Problems;Falls/history of falls;Fatigue/poor endurance;Muscle weakness;Difficulty with daily activities;Other 1 (comment) coordination  -EM      Type of Pain  Shoulder pain Right  -EM      Date Current Problem(s) Began  19 (MS diagnosis)  -EM      Brief Description of Current Complaint  Pt. reports that since last therapy sessions she has stayed the same in function but has had an increase in the number of falls.  Pt. would like to work on balance, walking and strength. She reports during one of her falls she fell on her hand and received therapy for that but it continues to be limited in digits 3-5. Pt reports she has difficulty with buttons and varioud FMC and her R hand has a tremor.   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[employer], word searches  -EM         Pain     Pain Location  Shoulder Right  -EM       Pain at Present  2  -EM      Pain at Best  2  -EM      Pain at Worst  6  -EM         Fall Risk Assessment    Any falls in the past year:  Yes  -EM      Number of falls reported in the last 12 months  6  -EM      Factors that contributed to the fall:  Tripped;Lost balance  -EM      Does patient have a fear of falling  No  -EM         Daily Activities    Primary Language  English  -EM      Are you able to read  Yes  -EM      Are you able to write  Yes  -EM      Teaching needs identified  Home Exercise Program;Falls Prevention  -EM      Barriers to learning  None  -EM      Pt Participated in POC and Goals  Yes  -EM         Safety    Are you being hurt, hit, or frightened by anyone at home or in your life?  No  -EM      Are you being neglected by a caregiver  No  -EM        User Key  (r) = Recorded By, (t) = Taken By, (c) = Cosigned By    Initials Name Provider Type    EM Mayra Zarco, OTR Occupational Therapist          OT Neuro     Row Name 08/07/19 1400             Home Living    Living Arrangements  house  -EM      Home Accessibility  stairs to enter home  -EM      Number of Stairs, Main Entrance  three  -EM      Stair Railings, Main Entrance  railing on right side (ascending)  -EM      Home Equipment  Rolling walker;Cane;Quad cane;Grab bars shower chair and grab bars  -EM      Living Environment Comment  lives alone; two sons live nearby and check in on her.   -EM         General ROM    GENERAL ROM COMMENTS  B UEs WFL; see PT for LE details  -EM         MMT (Manual Muscle Testing)    General MMT Comments  B UE gross MMT 4/5 with mild pain.   -EM         ADL Assessment/Intervention    Comment, IADL Assessment/Training  Pt reports she does a lot of micorwave meals and her neighbor will bring dinner sometimes. Pt uses rollator in the house to carry things around. Pt reports her bathroom is set-up to accomodate her and takes her time when bathing. Pt reports she doesn't leave the house much.   -EM      Additional  Documentation  Comment, IADL Assessment/Training (Row)  -EM        User Key  (r) = Recorded By, (t) = Taken By, (c) = Cosigned By    Initials Name Provider Type    Mayra Johnson OTR Occupational Therapist             Hand Therapy (last 24 hours)      Hand Eval     Row Name 08/07/19 1430             Hand  Strength     Strength Affected Side  Bilateral  -EM          Strength Right    # Reps  3  -EM      Right Rung  2  -EM      Right  Test 1  60  -EM      Right  Test 2  55  -EM      Right  Test 3  54  -EM       Strength Average Right  56.33  -EM          Strength Left    # Reps  3  -EM      Left Rung  2  -EM      Left  Test 1  22  -EM      Left  Test 2  20  -EM      Left  Test 3  22  -EM       Strength Average Left  21.33  -EM         Pinch Strength    Number of Reps  3  -EM      Affected Side  Bilateral  -EM         Right Hand Strength - Pinch (lbs)    Lateral  15 lbs  -EM         Left Hand Strength - Pinch (lbs)    Lateral  12 lbs  -EM        User Key  (r) = Recorded By, (t) = Taken By, (c) = Cosigned By    Initials Name Provider Type    Mayra Johnson OTR Occupational Therapist              Therapy Education  Education Details: role of OT and POC  Program: New  How Provided: Verbal  Provided to: Patient  Level of Understanding: Verbalized    OT Goals     Row Name 08/07/19 1430          OT Short Term Goals    STG Date to Achieve  09/04/19  -EM     STG 1  Patient will participate in hand strengthening and coordination activities and be educated in HEP to address functional use and strength of B hands  -EM     STG 1 Progress  New  -EM     STG 2  Patient will participate in activities to increase energy and strength for tasks  -EM     STG 2 Progress  New  -EM     STG 3  Patient will participate in dynamic functional mobility activities while incorporating B UE with min A  -EM     STG 3 Progress  New  -EM        Long Term Goals    LTG Date to Achieve  10/02/19  -EM      LTG 1  Patient will be independent with hand strengthening and coordination HEP  -EM     LTG 1 Progress  New  -EM     LTG 2  Patient will be independent with HEP to increase strength in B UE for ADL tasks  -EM     LTG 2 Progress  New  -EM     LTG 3  Patient will complete dynamic standing activity while reaching above head with mod I   -EM     LTG 3 Progress  New  -EM        Time Calculation    OT Goal Re-Cert Due Date  11/05/19  -EM       User Key  (r) = Recorded By, (t) = Taken By, (c) = Cosigned By    Initials Name Provider Type    EM Mayra Zarco, OTR Occupational Therapist          OT Assessment/Plan     Row Name 08/07/19 7523          OT Assessment    Functional Limitations  Decreased safety during functional activities;Limitation in home management;Limitations in community activities;Performance in leisure activities;Performance in self-care ADL  -EM     Impairments  Balance;Coordination;Endurance;Dexterity;Sensation;Muscle strength  -EM     Assessment Comments  Pt is a 79 y/o female who is living with MS for greater than 20 years. She recently has had an increase in falls and a decrease in coordination. She demo decreased independence, safety and satisfaction with ADL tasks and engaging in desired occupations d/t above listed impairments.  -EM     Please refer to paper survey for additional self-reported information  Yes  -EM     OT Rehab Potential  Good  -EM     Patient/caregiver participated in establishment of treatment plan and goals  Yes  -EM     Patient would benefit from skilled therapy intervention  Yes  -EM        OT Plan    OT Frequency  1x/week  -EM     Predicted Duration of Therapy Intervention (Therapy Eval)  8 visits  -EM     Planned CPT's?  OT EVAL MOD COMPLEXITY: 82270;OT THER PROC EA 15 MIN: 77363YO;OT SELF CARE/MGMT/TRAIN 15 MIN: 80333;OT THER ACT EA 15 MIN: 26684MI;OT NEUROMUSC RE EDUCATION EA 15 MIN: 23180  -EM     Planned Therapy Interventions (Optional Details)  balance training;home  exercise program;motor coordination training;strengthening;postural re-education;patient/family education;neuromuscular re-education;stretching;transfer training;other (see comments) ADL retraining  -EM     OT Plan Comments  Recommend skilled OT services to address established goals as specified.  -EM       User Key  (r) = Recorded By, (t) = Taken By, (c) = Cosigned By    Initials Name Provider Type    Mayra Johnson, OTR Occupational Therapist              Outcome Measure Options: 9 Hole Peg, ABC Balance, Quick DASH  9 Hole Peg  9-Hole Peg Left: 32.50  9-Hole Peg Right: 28.22  Quick DASH  Open a tight or new jar.: Moderate Difficulty  Do heavy household chores (e.g., wash walls, wash floors): Severe Difficulty  Carry a shopping bag or briefcase: No Difficulty  Wash your back: No Difficulty  Use a knife to cut food: No Difficulty  Recreational activities in which you take some force or impact through your arm, should or hand (e.g. golf, hammering, tennis, etc.): Unable  During the past week, to what extent has your arm, shoulder, or hand problem interfered with your normal social activites with family, friends, neighbors or groups?: Quite a bit  During the past week, were you limited in your work or other regular daily activities as a result of your arm, shoulder or hand problem?: Very limited  Arm, Shoulder, or hand pain: Extreme  Tingling (pins and needles) in your arm, shoulder, or hand: None  During the past week, how much difficulty have you had sleeping because of the pain in your arm, shoulder or hand?: Severe Difficulty  Number of Questions Answered: 11  Quick DASH Score: 50         Time Calculation:   OT Start Time: 1430     Therapy Charges for Today     Code Description Service Date Service Provider Modifiers Qty    95834446155  OT EVAL MOD COMPLEXITY 4 8/7/2019 Mayra Zarco OTR GO 1                     CECY Nguyen  8/7/2019

## 2019-08-13 ENCOUNTER — HOSPITAL ENCOUNTER (OUTPATIENT)
Dept: OCCUPATIONAL THERAPY | Facility: HOSPITAL | Age: 79
Setting detail: THERAPIES SERIES
Discharge: HOME | End: 2019-08-13

## 2019-08-13 DIAGNOSIS — Z78.9 DECREASED INDEPENDENCE WITH ACTIVITIES OF DAILY LIVING: Primary | ICD-10-CM

## 2019-08-13 DIAGNOSIS — Z74.09 DECREASED FUNCTIONAL MOBILITY AND ENDURANCE: ICD-10-CM

## 2019-08-13 PROCEDURE — 97110 THERAPEUTIC EXERCISES: CPT | Performed by: OCCUPATIONAL THERAPIST

## 2019-08-13 PROCEDURE — 97530 THERAPEUTIC ACTIVITIES: CPT | Performed by: OCCUPATIONAL THERAPIST

## 2019-08-13 PROCEDURE — 97112 NEUROMUSCULAR REEDUCATION: CPT | Performed by: OCCUPATIONAL THERAPIST

## 2019-08-13 NOTE — THERAPY TREATMENT NOTE
Outpatient Occupational Therapy Neuro Treatment Note  [facility]     Patient Name: Roselia Lobo  : 1940  MRN: [id number]  Today's Date: 2019       Visit Date: 2019    Patient Active Problem List   Diagnosis   • Multiple sclerosis (CMS/HCC)   • Neuropathic pain   • Depression   • Polyneuropathy        Past Medical History:   Diagnosis Date   • Depression    • Multiple sclerosis (CMS/HCC)      · Assessed By: Isaac Torres (Neurology); Last Assessed: 2013        Past Surgical History:   Procedure Laterality Date   • HIP SURGERY           Visit Dx:    ICD-10-CM ICD-9-CM   1. Decreased independence with activities of daily living Z65.8 V62.89   2. Decreased functional mobility and endurance Z74.09 780.99                   OT Assessment/Plan     Row Name 19 1430          OT Assessment    Assessment Comments  Pt continues to demo impaired functional mobility, poor posture, weakness throughout and overall impaired energy for tasks. She demo good understanding of exercises and good participation.   -EM        OT Plan    OT Plan Comments  Continue per POC. Further address dynamic standing balance and coordination to increase independence with HM tasks.   -EM       User Key  (r) = Recorded By, (t) = Taken By, (c) = Cosigned By    Initials Name Provider Type    Mayra Johnson, OTR Occupational Therapist              Therapy Education  Given: HEP, Posture/body mechanics  Program: New, Reinforced  How Provided: Verbal, Demonstration  Provided to: Patient  Level of Understanding: Verbalized, Demonstrated      OT Exercises     Row Name 19 1430             Precautions    Existing Precautions/Restrictions  fall  -EM         Subjective Comments    Subjective Comments  Pt reports she hasn't done much today  -EM         Subjective Pain    Able to rate subjective pain?  yes  -EM      Pre-Treatment Pain Level  0  -EM      Post-Treatment Pain Level  0  -EM         Total Minutes    49421 - OT  Therapeutic Exercise Minutes  30  -EM      14888 -  OT Neuromuscular Reeducation Minutes  15  -EM      37852 - OT Therapeutic Activity Minutes  10  -EM         Exercise 1    Exercise Name 1  Nu-step for energy and strength  -EM      Time (Minutes) 1  10 mins at level 5  -EM         Exercise 2    Exercise Name 2   and digit strengthening  -EM      Equipment 2  Hand Gripper  -EM      Resistance 2  Red;Green  -EM      Sets 2  4 alternated hands; 2 each color  -EM      Reps 2  10  -EM         Exercise 3    Exercise Name 3  Theraputty activity completed with green putty, small beans, and small pegs to focus on lateral pinch, tip pinch, in-hand manipulation, and translation. Increased time and fatigue noted with L hand compared to R hand.   -EM         Exercise 4    Exercise Name 4  Pt stood w/ feet together and no UE support for 1 min, added arm circles (5 one way, LOB, regained with use of B UEs, and 5 circles the opposite way).  -EM         Exercise 5    Exercise Name 5  Marching in place with feet hip distance apart and UE support 50% of time. Completed for 1 min.   -EM         Exercise 6    Exercise Name 6  mass finger flexion and extension  -EM      Resistance 6  Red;Green red for left hand and green for right  -EM      Sets 6  3  -EM      Reps 6  10  -EM        User Key  (r) = Recorded By, (t) = Taken By, (c) = Cosigned By    Initials Name Provider Type    Mayra Johnson OTR Occupational Therapist                      Time Calculation:   OT Start Time: 1430     Therapy Charges for Today     Code Description Service Date Service Provider Modifiers Qty    15183764398  OT NEUROMUSC RE EDUCATION EA 15 MIN 8/13/2019 Mayra Zarco OTR GO 1    [id number]  OT THER PROC EA 15 MIN 8/13/2019 Mayra Zarco OTR GO 2    [id number]  OT THERAPEUTIC ACT EA 15 MIN 8/13/2019 Mayra Zarco OTR GO 1                    CECY Nguyen  8/13/2019

## 2019-08-21 ENCOUNTER — HOSPITAL ENCOUNTER (OUTPATIENT)
Dept: OCCUPATIONAL THERAPY | Facility: HOSPITAL | Age: 79
Setting detail: THERAPIES SERIES
Discharge: HOME | End: 2019-08-21

## 2019-08-21 DIAGNOSIS — Z74.09 DECREASED FUNCTIONAL MOBILITY AND ENDURANCE: ICD-10-CM

## 2019-08-21 DIAGNOSIS — Z78.9 DECREASED INDEPENDENCE WITH ACTIVITIES OF DAILY LIVING: Primary | ICD-10-CM

## 2019-08-21 PROCEDURE — 97530 THERAPEUTIC ACTIVITIES: CPT | Performed by: OCCUPATIONAL THERAPIST

## 2019-08-21 PROCEDURE — 97110 THERAPEUTIC EXERCISES: CPT | Performed by: OCCUPATIONAL THERAPIST

## 2019-08-21 PROCEDURE — 97112 NEUROMUSCULAR REEDUCATION: CPT | Performed by: OCCUPATIONAL THERAPIST

## 2019-08-21 NOTE — THERAPY TREATMENT NOTE
Outpatient Occupational Therapy Neuro Treatment Note  [facility]     Patient Name: Roselia Lobo  : 1940  MRN: [id number]  Today's Date: 2019       Visit Date: 2019    Patient Active Problem List   Diagnosis   • Multiple sclerosis (CMS/HCC)   • Neuropathic pain   • Depression   • Polyneuropathy        Past Medical History:   Diagnosis Date   • Depression    • Multiple sclerosis (CMS/HCC)      · Assessed By: Isaac Torres (Neurology); Last Assessed: 2013        Past Surgical History:   Procedure Laterality Date   • HIP SURGERY           Visit Dx:    ICD-10-CM ICD-9-CM   1. Decreased independence with activities of daily living Z65.8 V62.89   2. Decreased functional mobility and endurance Z74.09 780.99                   OT Assessment/Plan     Row Name 19 1400          OT Assessment    Assessment Comments  Pt demo impaired core strength and midline and impaired coordination and strength bilaterally which is worse in R UE. Continues to demo impaired dynamic balance and encouraged to use RW regularly.   -EM        OT Plan    OT Plan Comments  Continue per POC - further address core strength, balance, UB coordination and overall strength and energy for tasks.   -EM       User Key  (r) = Recorded By, (t) = Taken By, (c) = Cosigned By    Initials Name Provider Type    Mayra Johnson, OTR Occupational Therapist              Therapy Education  Given: HEP, Posture/body mechanics  Program: New, Reinforced  How Provided: Verbal, Demonstration  Provided to: Patient  Level of Understanding: Verbalized, Demonstrated      OT Exercises     Row Name 19 1400             Precautions    Existing Precautions/Restrictions  fall  -EM         Subjective Comments    Subjective Comments  Pt reports she doesn't like to use her RW  -EM         Subjective Pain    Able to rate subjective pain?  yes  -EM      Pre-Treatment Pain Level  0  -EM      Post-Treatment Pain Level  0  -EM         Total Minutes     21165 - OT Therapeutic Exercise Minutes  30  -EM      56450 -  OT Neuromuscular Reeducation Minutes  15  -EM      68729 - OT Therapeutic Activity Minutes  15  -EM         Exercise 1    Exercise Name 1  Nu-step for energy and strength  -EM      Time (Minutes) 1  10 mins at level 6  -EM         Exercise 2    Exercise Name 2  Pt sat on air disc focusing on pelvic and lower core strengthening. Pelvic tilts laterally and then anterior/posterior w/ VC for technique  -EM      Sets 2  2  -EM      Reps 2  10  -EM         Exercise 3    Exercise Name 3  Theraputty activity completed with green putty, small beans, and small pegs to focus on lateral pinch, tip pinch, in-hand manipulation, and translation. Increased time and fatigue noted with L hand compared to R hand.   -EM         Exercise 4    Exercise Name 4  Sitting EOM focsuing on core strength using 4# dowel george to reach and repetitively hit dynamic target at and above head level focusing on core strength and UB strength.  -EM      Time (Minutes) 14  Completed for 8 mins total w/ short rest breaks to regain core strength.  -EM         Exercise 5    Exercise Name 5  Standing with close SBA - min A to correct LOB while incorporating UEs into GMC activity. 3 sets of 3 mins standing.   -EM        User Key  (r) = Recorded By, (t) = Taken By, (c) = Cosigned By    Initials Name Provider Type    Mayra Johnson OTR Occupational Therapist                      Time Calculation:   OT Start Time: 1400     Therapy Charges for Today     Code Description Service Date Service Provider Modifiers Qty    61122264557  OT NEUROMUSC RE EDUCATION EA 15 MIN 8/21/2019 Mayra Zarco OTR  1    [id number]  OT THER PROC EA 15 MIN 8/21/2019 Mayra Zarco OTR  2    [id number]  OT THERAPEUTIC ACT EA 15 MIN 8/21/2019 Mayra Zarco OTR  1                    CECY Nguyen  8/21/2019

## 2019-08-28 ENCOUNTER — HOSPITAL ENCOUNTER (OUTPATIENT)
Dept: OCCUPATIONAL THERAPY | Facility: HOSPITAL | Age: 79
Setting detail: THERAPIES SERIES
Discharge: HOME | End: 2019-08-28

## 2019-08-28 DIAGNOSIS — Z74.09 DECREASED FUNCTIONAL MOBILITY AND ENDURANCE: ICD-10-CM

## 2019-08-28 DIAGNOSIS — Z78.9 DECREASED INDEPENDENCE WITH ACTIVITIES OF DAILY LIVING: Primary | ICD-10-CM

## 2019-08-28 PROCEDURE — 97110 THERAPEUTIC EXERCISES: CPT | Performed by: OCCUPATIONAL THERAPIST

## 2019-08-28 PROCEDURE — 97530 THERAPEUTIC ACTIVITIES: CPT | Performed by: OCCUPATIONAL THERAPIST

## 2019-08-28 NOTE — THERAPY TREATMENT NOTE
Outpatient Occupational Therapy Neuro Treatment Note  [facility]     Patient Name: Roselia Lobo  : 1940  MRN: [id number]  Today's Date: 2019       Visit Date: 2019    Patient Active Problem List   Diagnosis   • Multiple sclerosis (CMS/HCC)   • Neuropathic pain   • Depression   • Polyneuropathy        Past Medical History:   Diagnosis Date   • Depression    • Multiple sclerosis (CMS/HCC)      · Assessed By: Isaac Torres (Neurology); Last Assessed: 2013        Past Surgical History:   Procedure Laterality Date   • HIP SURGERY           Visit Dx:    ICD-10-CM ICD-9-CM   1. Decreased independence with activities of daily living Z65.8 V62.89   2. Decreased functional mobility and endurance Z74.09 780.99                   OT Assessment/Plan     Row Name 19 1300          OT Assessment    Assessment Comments  Pt demo poor posture while sitting and standing unsupported. U/a to stand for more than 2 mins on challenging surface w/o using 1 hand for support. Continues to demo impaired balance and FMC and weakness in B hands.   -EM        OT Plan    OT Plan Comments  Continue per POC - further focus on posture and core strength  -EM       User Key  (r) = Recorded By, (t) = Taken By, (c) = Cosigned By    Initials Name Provider Type    EM Mayra Zarco, OTR Occupational Therapist                     Therapy Education  Given: HEP, Posture/body mechanics  Program: New, Reinforced  How Provided: Verbal, Demonstration  Provided to: Patient  Level of Understanding: Verbalized, Demonstrated      OT Exercises     Row Name 19 1430             Precautions    Existing Precautions/Restrictions  fall  -EM         Subjective Comments    Subjective Comments  Pt reports she continues to have falls  -EM         Subjective Pain    Able to rate subjective pain?  yes  -EM      Pre-Treatment Pain Level  0  -EM      Post-Treatment Pain Level  0  -EM         Total Minutes    53328 - OT Therapeutic Exercise  Minutes  15  -EM      40740 - OT Therapeutic Activity Minutes  40  -EM         Exercise 1    Exercise Name 1  Nu-step for energy and strength  -EM      Time (Minutes) 1  10 mins at level 6 w/ avg RPM at 60  -EM         Exercise 2    Exercise Name 2  Standing on airex for 10  mins while engaging B UEs into FMC tasks. Pt was SBA; however used opposite hand to stabilize and correct LOB 75% of time. Pt required mod VC for upright posture and demo lateral lean toward L as she fatigued.   -EM         Exercise 3    Exercise Name 3  FMC and hand strengthening activities completed focusing on increasing functional use of B hands. Pt demo lateral lean toward the L w/ mod VC to correct posture while sitting w/ back unsupported completing FMC activities.   -EM        User Key  (r) = Recorded By, (t) = Taken By, (c) = Cosigned By    Initials Name Provider Type    EM Mayra Zarco OTR Occupational Therapist                      Time Calculation:   OT Start Time: 1430     Therapy Charges for Today     Code Description Service Date Service Provider Modifiers Qty    51716580538  OT THER PROC EA 15 MIN 8/28/2019 Mayra Zarco OTR GO 1    [id number]  OT THERAPEUTIC ACT EA 15 MIN 8/28/2019 Mayra Zarco OTR GO 3                    CECY Nguyen  8/28/2019

## 2019-08-30 ENCOUNTER — HOSPITAL ENCOUNTER (OUTPATIENT)
Dept: PHYSICAL THERAPY | Facility: HOSPITAL | Age: 79
Setting detail: THERAPIES SERIES
Discharge: HOME | End: 2019-08-30

## 2019-08-30 DIAGNOSIS — R26.89 BALANCE PROBLEM: ICD-10-CM

## 2019-08-30 DIAGNOSIS — R26.9 GAIT DIFFICULTY: Primary | ICD-10-CM

## 2019-08-30 PROCEDURE — 97112 NEUROMUSCULAR REEDUCATION: CPT | Performed by: PHYSICAL THERAPIST

## 2019-08-30 PROCEDURE — 97110 THERAPEUTIC EXERCISES: CPT | Performed by: PHYSICAL THERAPIST

## 2019-08-30 NOTE — THERAPY PROGRESS REPORT/RE-CERT
Outpatient Physical Therapy Neuro Progress Note  [facility]     Patient Name: Roselia Lobo  : 1940  MRN: [id number]  Today's Date: 2019      Visit Date: 2019    Visit Dx:    ICD-10-CM ICD-9-CM   1. Gait difficulty R26.9 781.2   2. Balance problem R26.89 781.99       Patient Active Problem List   Diagnosis   • Multiple sclerosis (CMS/HCC)   • Neuropathic pain   • Depression   • Polyneuropathy           PT Neuro     Row Name 19 1300             Subjective Pain    Able to rate subjective pain?  yes  -MW      Pre-Treatment Pain Level  8  -MW      Post-Treatment Pain Level  8  -MW      Subjective Pain Comment  B LEs  -MW         Balance Skills Training    11663 -  PT Neuromuscular Reeducation Minutes  35  -MW      Training Strategies (Balance)  Re-assessment completed, see for details.  ST. balance with one foot on ball and hold with UE A and min A for balance up to 10 sec x 4 sets.  Alternating tapping ball x 4 with UE A and min A.  St. balance with fwd and B rotational toss/catch ball on/off rebouncer x 10, 2 sets with min A and VCing to decrease L lateral trunk flexion and R trendelenburg with min/mod A.    -        User Key  (r) = Recorded By, (t) = Taken By, (c) = Cosigned By    Initials Name Provider Type    Marj Jones, PT Physical Therapist                  PT Assessment/Plan     Row Name 19 1300          PT Assessment    Functional Limitations  Decreased safety during functional activities;Impaired gait;Impaired locomotion;Limitations in community activities;Performance in leisure activities  -     Impairments  Balance;Muscle strength;Pain;Endurance;Impaired muscle endurance  -     Assessment Comments  Pt. met STG for TUG today.  Pt. requires min/mod A with standing balance activities with LOB up to 40% of the time.  Pt. presents with L lateral trunk flexion and R trendelenburg with gait.   Pt. to benefit from skilled PT services to improve functional  "mobility and meet remaining goals.  -MW     Please refer to paper survey for additional self-reported information  Yes  -MW     Rehab Potential  Good  -MW     Patient/caregiver participated in establishment of treatment plan and goals  Yes  -MW     Patient would benefit from skilled therapy intervention  Yes  -MW        PT Plan    PT Frequency  1x/week  -MW     Predicted Duration of Therapy Intervention (Therapy Eval)  11 visits  -MW     Planned CPT's?  PT THER PROC EA 15 MIN: 81649;PT THER ACT EA 15 MIN: 20455;PT NEUROMUSC RE-EDUCATION EA 15 MIN: 22629;PT GAIT TRAINING EA 15 MIN: 55500  -MW     PT Plan Comments  Continue with PT services to improve gait, balance, strength, transfers and overall functional mobility.  -MW       User Key  (r) = Recorded By, (t) = Taken By, (c) = Cosigned By    Initials Name Provider Type    Marj Jones, PT Physical Therapist             Exercises     Row Name 08/30/19 1300             Precautions    Existing Precautions/Restrictions  fall  -MW         Subjective Comments    Subjective Comments  \"I'm doing alright.  I'm trying to do my exercises.\"  -MW         Subjective Pain    Able to rate subjective pain?  yes  -MW      Pre-Treatment Pain Level  8  -MW      Post-Treatment Pain Level  8  -MW      Subjective Pain Comment  B LEs  -MW         Total Minutes    23200 - PT Therapeutic Exercise Minutes  8  -MW      46423 -  PT Neuromuscular Reeducation Minutes  35  -MW         Exercise 1    Exercise Name 1  NuStep L6  -MW      Time 1  8 min  -MW      Additional Comments  B UE/LEs  -MW        User Key  (r) = Recorded By, (t) = Taken By, (c) = Cosigned By    Initials Name Provider Type    Marj Jones, PT Physical Therapist                      PT OP Goals     Row Name 08/30/19 1300          PT Short Term Goals    STG Date to Achieve  08/29/19  -MW     STG 1  Patient to improve NAVAS balance score to >/= 25/56 to decrease client's risk of falls.  -MW     STG 1 Progress  " Ongoing  -     STG 2  Patient to perform TUG within 17 sec without LOB for improved functional mobility.  -     STG 2 Progress  Met  -     STG 3  Patient to ambulate 10 meters without AD within 15 sec without LOB for improved gait nadeem and functional mobility.  -MW     STG 3 Progress  Ongoing  -     STG 4  Pt. to perform 2 sit to stands within 30 seconds without UE A for improved functional mobility.    -     STG 4 Progress  Ongoing  -     STG 5  Patient to improve FGA score to >/= 11/30 to decrease client's risk of falls.  -     STG 5 Progress  Ongoing  -        Long Term Goals    LTG Date to Achieve  10/17/19  -     LTG 1  Patient to improve NAVAS balance score to >/= 38/56 to decrease client's risk of falls.  -     LTG 1 Progress  Ongoing  -     LTG 2  Patient to perform TUG within 15 sec without LOB for improved functional mobility.  -     LTG 2 Progress  Ongoing  -     LTG 3  Patient to ambulate 10 meters without AD within 14 sec without LOB for improved gait nadeem and functional mobility.  -     LTG 3 Progress  Ongoing  -     LTG 4  Pt. to perform 3 sit to stands within 30 seconds without UE A for improved functional mobility.    -     LTG 4 Progress  New  -     LTG 5  Patient to improve FGA score to >/= 17/30 to decrease client's risk of falls.  -     LTG 5 Progress  Ongoing  -     LTG 6  Pt. to be I with HEP.  -     LTG 6 Progress  Ongoing  [facility]        Time Calculation    PT Goal Re-Cert Due Date  10/09/19  -       User Key  (r) = Recorded By, (t) = Taken By, (c) = Cosigned By    Initials Name Provider Type    Marj Jones, PT Physical Therapist          Therapy Education  Given: Symptoms/condition management, Posture/body mechanics, Mobility training  Program: Reinforced  How Provided: Verbal  Provided to: Patient  Level of Understanding: Verbalized, Teach back education performed    Outcome Measure Options: 10 Meter Walk, Timed Up and Go (TUG)  Gait,  Assistive Device: rolling walker  10 Meter Walk Test Self-Selected Velocity  Self-Selected Velocity: Trial 1: 12.2 sec.  10 Meter Walk Test Fast Velocity  10 Meter Walk Fast Velocity: Trial 1: 10 sec.  Timed Up and Go (TUG)  TUG Test 1: 16.3 seconds      Time Calculation:   Start Time: 1300   Therapy Charges for Today     Code Description Service Date Service Provider Modifiers Qty    58432945909 HC PT NEUROMUSC RE EDUCATION EA 15 MIN 8/30/2019 Marj Reese, PT GP 2    60337630394 HC PT THER PROC EA 15 MIN 8/30/2019 Marj Reese, PT GP 1          PT G-Codes  Outcome Measure Options: 10 Meter Walk, Timed Up and Go (TUG)  TUG Test 1: 16.3 seconds         Marj Reese, PT  8/30/2019

## 2019-09-04 ENCOUNTER — TREATMENT (OUTPATIENT)
Dept: PHYSICAL THERAPY | Facility: CLINIC | Age: 79
End: 2019-09-04

## 2019-09-04 DIAGNOSIS — Z74.09 DECREASED FUNCTIONAL MOBILITY AND ENDURANCE: ICD-10-CM

## 2019-09-04 DIAGNOSIS — Z78.9 DECREASED INDEPENDENCE WITH ACTIVITIES OF DAILY LIVING: Primary | ICD-10-CM

## 2019-09-04 DIAGNOSIS — R27.8 DECREASED COORDINATION: ICD-10-CM

## 2019-09-04 PROCEDURE — 97530 THERAPEUTIC ACTIVITIES: CPT | Performed by: OCCUPATIONAL THERAPIST

## 2019-09-04 PROCEDURE — 97110 THERAPEUTIC EXERCISES: CPT | Performed by: OCCUPATIONAL THERAPIST

## 2019-09-04 NOTE — PROGRESS NOTES
"OccupationalTherapy Daily Progress Note  Visits:5        Roselia Lobo reports: \"I really got to just do this to get better at it\"    Subjective     Objective   See Exercise, Manual, and Modality Logs for complete treatment.       Assessment & Plan     Assessment  Assessment details: Pt demo good participation and motivation throughout. She demo poor posture and balance when engaging B UEs into tasks and requires VC for corrections. She demo weakness in B hips and core and impaired coordination.     Plan  Plan details: Recommend pt continue w/ skilled OT services to further address established goals as specified. Further address functional balance, energy and strength.           Manual Therapy:          mins  61379;  Therapeutic Exercise:     30     mins  53364;     Neuromuscular Kisha:         mins  39874;    Therapeutic Activity:      15     mins  59790;     Gait Training:            mins  99821;     Ultrasound:           mins  86604;    Electrical Stimulation:          mins  60341 ( );  Dry Needling           mins self-pay  Traction           mins 46756  Canalith Repositioning         mins 84524      Timed Treatment:   45   mins   Total Treatment:     45   mins    Mayra Zarco, OTR  [place] License #: [id number]    Occupational Therapist  "

## 2019-09-05 ENCOUNTER — TREATMENT (OUTPATIENT)
Dept: PHYSICAL THERAPY | Facility: CLINIC | Age: 79
End: 2019-09-05

## 2019-09-05 DIAGNOSIS — Z78.9 DECREASED INDEPENDENCE WITH ACTIVITIES OF DAILY LIVING: Primary | ICD-10-CM

## 2019-09-05 DIAGNOSIS — Z74.09 DECREASED FUNCTIONAL MOBILITY AND ENDURANCE: ICD-10-CM

## 2019-09-05 PROCEDURE — 97110 THERAPEUTIC EXERCISES: CPT | Performed by: PHYSICAL THERAPIST

## 2019-09-05 PROCEDURE — 97112 NEUROMUSCULAR REEDUCATION: CPT | Performed by: PHYSICAL THERAPIST

## 2019-09-05 NOTE — PROGRESS NOTES
"Physical Therapy Daily Progress Note  Visit: 5  Date of Initial Visit: Type: THERAPY  Noted: 2019    Roselia Lobo reports: \"I'm feeling better today.\"    Subjective Evaluation    Pain  Current pain ratin           Objective   See Exercise, Manual, and Modality Logs for complete treatment.       Assessment & Plan     Assessment  Assessment details: Pt requires min A with standing balance in parallel bars with emphasis on midline orientation.  Pt continues to demonstrate R Trendelenburg with L lateral trunk flexion secondary to muscle weakness.  Pt to benefit from skilled PT services to improve functional mobility, posture, gait and balance.    Plan  Plan details: Pt to continue with PT services to improve gait, balance, strength, transfers and overall functional mobility.          Manual Therapy:          mins  77976;  Therapeutic Exercise:     30     mins  05251;     Neuromuscular Kisha:     10    mins  01800;    Therapeutic Activity:           mins  62499;     Gait Training:            mins  06319;     Ultrasound:           mins  29801;    Electrical Stimulation:          mins  41845 ( );  Dry Needling           mins self-pay  Traction           mins 90025  Canalith Repositioning         mins 67895      Timed Treatment:   40   mins   Total Treatment:     45   mins    Marj Reese, PT  [place] License #: [id number]    Physical Therapist    "

## 2019-09-12 ENCOUNTER — TREATMENT (OUTPATIENT)
Dept: PHYSICAL THERAPY | Facility: CLINIC | Age: 79
End: 2019-09-12

## 2019-09-12 DIAGNOSIS — R26.9 GAIT DIFFICULTY: Primary | ICD-10-CM

## 2019-09-12 DIAGNOSIS — R26.89 BALANCE PROBLEM: ICD-10-CM

## 2019-09-12 PROCEDURE — 97112 NEUROMUSCULAR REEDUCATION: CPT | Performed by: PHYSICAL THERAPIST

## 2019-09-12 PROCEDURE — 97110 THERAPEUTIC EXERCISES: CPT | Performed by: PHYSICAL THERAPIST

## 2019-09-12 NOTE — PROGRESS NOTES
"Physical Therapy Daily Progress Note  Visit: 6  Date of Initial Visit: Type: THERAPY  Noted: 7/11/2019    Roselia Lobo reports: \"I haven't had a fall this week.\"    Subjective Evaluation    Pain  No pain reported           Objective   See Exercise, Manual, and Modality Logs for complete treatment.    Exercise 1  Exercise Name 1: NuStep; B UE/LEs  Equipment/Resistance 1: level 6  Time: 8 min  Exercise 2  Exercise Name 2: B LE marching, knee flexion, hip abduction, ankle DF/PF  Equipment/Resistance 2: St. balance on blue foam with B UE TRX; mod A  Sets/Reps 2: 10  Exercise 3  Exercise Name 3: Tall kneeling fwd/bwd walking  Equipment/Resistance 3: min A  Exercise 4  Exercise Name 4: B shoulder abduction and alternating punchin  Equipment/Resistance 4: St. balance on blue foam with B UE TRX; mod A  Sets/Reps 4: 10  Exercise 5  Exercise Name 5: Quad B hip circles clockwise and counterclockwise  Sets/Reps 5: 10  Exercise 6  Exercise Name 6: Resisted walking fwd/bwd  Equipment/Resistance 6: 20#  Sets/Reps 6: 30ft x 5; min/mod A  Exercise 7  Exercise Name 7: Prone mod plank; B side mod plank  Equipment/Resistance 7: min A  Sets/Reps 7: 10; 3 sec hold                PT Neuro          Assessment & Plan     Assessment  Assessment details: Pt requires min/mod A with standing dynamic balance activities.  Pt continues to demonstrate L lateral trunk flexion with activities secondary to Trendelenburg.  Pt to benefit from skilled PT services to improve gait, balance, strength, transfers and overall functional mobility.    Plan  Plan details: Pt to continue with PT services to improve gait, balance, strength, transfers and overall functional mobility.          Manual Therapy:          mins  61587;  Therapeutic Exercise:      20    mins  91592;     Neuromuscular Kisha:     25    mins  06554;    Therapeutic Activity:           mins  89691;     Gait Training:            mins  90449;     Ultrasound:           mins  31389;    Electrical " Stimulation:          mins  96618 ( );  Dry Needling           mins self-pay  Traction           mins 88802  Canalith Repositioning         mins 34787      Timed Treatment:   45   mins   Total Treatment:     45   mins    Marj Reese, PT  [place] License #: [id number]    Physical Therapist

## 2019-09-18 ENCOUNTER — TREATMENT (OUTPATIENT)
Dept: PHYSICAL THERAPY | Facility: CLINIC | Age: 79
End: 2019-09-18

## 2019-09-18 DIAGNOSIS — Z78.9 DECREASED INDEPENDENCE WITH ACTIVITIES OF DAILY LIVING: Primary | ICD-10-CM

## 2019-09-18 DIAGNOSIS — Z74.09 DECREASED FUNCTIONAL MOBILITY AND ENDURANCE: ICD-10-CM

## 2019-09-18 DIAGNOSIS — R27.8 DECREASED COORDINATION: ICD-10-CM

## 2019-09-18 PROCEDURE — 97530 THERAPEUTIC ACTIVITIES: CPT | Performed by: OCCUPATIONAL THERAPIST

## 2019-09-18 PROCEDURE — 97110 THERAPEUTIC EXERCISES: CPT | Performed by: OCCUPATIONAL THERAPIST

## 2019-09-18 NOTE — PROGRESS NOTES
Re-Assessment / Re-Certification/ MD Note        Patient: Roselia Lobo   : 1940  Diagnosis/ICD-10 Code:  Decreased independence with activities of daily living [Z65.8]  Referring practitioner: Isaac Torres MD  Date of Initial Visit: Type: THERAPY  Noted: 2019  Today's Date: 2019  Patient seen for 6 sessions      Subjective:   Roselia Lobo reports she gets around fine at home but still has concerns with falling. She reports she gets tired easily; however takes breakd when she feels like she needs to.   Subjective Questionnaire: ABC: 55.63%  Clinical Progress: improved  Home Program Compliance: Yes  Treatment has included: therapeutic exercise, neuromuscular re-education, therapeutic activity and Home Management    Subjective   Objective       Strength/Myotome Testing     Left Wrist/Hand     Thumb Strength  Key/Lateral Pinch     Trial 1: 14 lbs    Trial 2: 11 lbs    Trial 3: 14 lbs    Average: 13 lbs    Right Wrist/Hand     Thumb Strength   Key/Lateral Pinch     Trial 1: 14 lbs    Trial 2: 14 lbs    Trial 3: 14 lbs    Average: 14 lbs    Additional Strength Details  9 HPT:  R 32.33  L 26.30     Assessment & Plan     Assessment  Impairments: abnormal coordination, abnormal gait, abnormal muscle firing, abnormal muscle tone, abnormal or restricted ROM, activity intolerance, impaired balance, impaired physical strength, lacks appropriate home exercise program and safety issue  Assessment details: Pt demo slightly increased speed with L hand on 9 HPT; however limited to no changes on R hand. Increased confidence w/ functional balance reported this date. Pt uses walker and hand rails when needed and refrains from activities that she is less confident with. She continues to demo impaired dynamic balance and coordination.   Prognosis: good  Functional Limitations: carrying objects, lifting, walking, pulling, pushing, standing and reaching overhead  Plan  Planned therapy interventions: ADL retraining,  abdominal trunk stabilization, body mechanics training, fine motor coordination training, functional ROM exercises, home exercise program, IADL retraining, transfer training, therapeutic activities, stretching, strengthening, postural training, neuromuscular re-education and motor coordination training  Frequency: 1x week  Duration in visits: 4  Treatment plan discussed with: patient  Plan details: Recommend pt continue w/ skilled OT services to further address established goals as specified. Further address functional balance, coordination and HM Tasks.           Progress toward previous goals: Partially Met       Recommendations: Continue as planned  Prognosis to achieve goals: good    OT Signature: CECY Nguyen      Based upon review of the patient's progress and continued therapy plan, it is my medical opinion that Roselia Lobo should continue occupational therapy treatment at [facility] TH[facility]  [address] [address] [phone].    Signature: __________________________________  Isaac Torres MD      Timed Codes        Manual Therapy:         mins  18731;    Therapeutic Exercise:    15     mins  90729;     Neuromuscular Kisha:        mins  20587;    Therapeutic Activity:     30     mins  48286;      Ultrasound:          mins  40645;    Community/ work         mins  70777  Self Care/ Sushma         mins  22106  Sensory Re-Int.                  mins   38320  Cognitve Re-train         mins  91607     Un-timed Codes  Electrical Stimulation:         mins 9 88495 ( );    Timed Treatment:   45   mins   Total Treatment:     45   mins

## 2019-09-19 ENCOUNTER — TREATMENT (OUTPATIENT)
Dept: PHYSICAL THERAPY | Facility: CLINIC | Age: 79
End: 2019-09-19

## 2019-09-19 DIAGNOSIS — Z78.9 DECREASED INDEPENDENCE WITH ACTIVITIES OF DAILY LIVING: Primary | ICD-10-CM

## 2019-09-19 DIAGNOSIS — Z74.09 DECREASED FUNCTIONAL MOBILITY AND ENDURANCE: ICD-10-CM

## 2019-09-19 PROCEDURE — 97112 NEUROMUSCULAR REEDUCATION: CPT | Performed by: PHYSICAL THERAPIST

## 2019-09-19 PROCEDURE — 97110 THERAPEUTIC EXERCISES: CPT | Performed by: PHYSICAL THERAPIST

## 2019-09-19 NOTE — PROGRESS NOTES
"Physical Therapy Daily Progress Note  Visit: 7  Date of Initial Visit: Type: THERAPY  Noted: 7/11/2019    Roselia Lobo reports: \"I'm still tired from yesterday.  My legs are weak.\"    Subjective Evaluation    Pain  No pain reported           Objective   See Exercise, Manual, and Modality Logs for complete treatment.    Exercise 1  Exercise Name 1: NuStep; B UE/LEs  Equipment/Resistance 1: level 6  Time: 10 min  Exercise 2  Exercise Name 2: Issued and performed HEP, see for details  Equipment/Resistance 2: min A  Exercise 2 Home Program: Yes  Exercise 3  Exercise Name 3: St. balance on blue foam with toss/catch ball onto/off trampoline without UE A  Equipment/Resistance 3: min A; emphasis on midline orientation  Exercise 4  Exercise Name 4: Tall kneeling with B wt shifting at the hips  Equipment/Resistance 4: min A/CGA  Sets/Reps 4: 10 x 2 sets  Exercise 5  Exercise Name 5: Quad opposite UEs  Equipment/Resistance 5: min A for equal B UE/LE WB  Sets/Reps 5: 10                PT Neuro          Assessment & Plan     Assessment  Assessment details: Pt requires min A with tall kneeling and standing balance with primary emphasis on midline orientation.  Pt continues to demonstrate R Trendelenburg with left lateral trunk flexion.  Pt requires several seated rest breaks secondary to fatigue.  Pt to benefit from skilled PT services to improve functional mobility.    Plan  Plan details: Pt to continue with PT services to improve gait, balance, strength, transfers and overall functional mobility.          Manual Therapy:          mins  23203;  Therapeutic Exercise:     30     mins  64900;     Neuromuscular Kisha:     15    mins  13269;    Therapeutic Activity:           mins  09088;     Gait Training:            mins  81435;     Ultrasound:           mins  55210;    Electrical Stimulation:          mins  17821 ( );  Dry Needling           mins self-pay  Traction           mins 66136  Canalith Repositioning         mins " [id number]      Timed Treatment:   45   mins   Total Treatment:     45   mins    Marj Reese, PT  [place] License #: [id number]    Physical Therapist

## 2019-09-26 ENCOUNTER — TREATMENT (OUTPATIENT)
Dept: PHYSICAL THERAPY | Facility: CLINIC | Age: 79
End: 2019-09-26

## 2019-09-26 DIAGNOSIS — R26.89 BALANCE PROBLEM: ICD-10-CM

## 2019-09-26 DIAGNOSIS — R26.9 GAIT DIFFICULTY: Primary | ICD-10-CM

## 2019-09-26 PROCEDURE — 97112 NEUROMUSCULAR REEDUCATION: CPT | Performed by: PHYSICAL THERAPIST

## 2019-09-26 PROCEDURE — 97110 THERAPEUTIC EXERCISES: CPT | Performed by: PHYSICAL THERAPIST

## 2019-09-26 NOTE — PROGRESS NOTES
"PT Re-Assessment / Re-Certification        Patient: Roselia Lobo   : 1940  Diagnosis/ICD-10 Code:  Gait difficulty [R26.9]  Referring practitioner: Isaac Torres MD  Date of Initial Visit: Type: THERAPY  Noted: 2019  Today's Date: 2019  Patient seen for 8 sessions      Subjective:   Roselia Lobo reports: \"My friend called me and said I didn't sound good.  I might be getting more depressed.\"  Subjective Questionnaire: n/a  Clinical Progress: unchanged  Home Program Compliance: Yes  Treatment has included: gait, TA, TE, balance    Subjective Evaluation    Quality of life: good    Pain  No pain reported         Objective     PT Neuro         Assessment & Plan     Assessment  Impairments: abnormal coordination, abnormal gait, activity intolerance, impaired balance, impaired physical strength, lacks appropriate home exercise program and safety issue  Assessment details: Pt met STG for NAVAS today and is progressing slowly towards remaining goals.  Pt continues to demonstrate L lateral trunk flexion especially with increased fatigue. Pt requires mod A with sitting balance on BOSU and CGA with gait without AD with forward flexed posture  Pt to benefit from additional therapy visits to meet remaining goals.    Prognosis: fair  Functional Limitations: lifting, walking and standing  Goals  Plan Goals:  PT Short Term Goals    STG Date to Achieve  19  MW      STG 1  Patient to improve NAVAS balance score to >/= 25/56 to decrease client's risk of falls.  MW      STG 1 Progress  Met  MW      STG 2  Patient to perform TUG within 17 sec without LOB for improved functional mobility.  MW      STG 2 Progress  Met  MW      STG 3  Patient to ambulate 10 meters without AD within 15 sec without LOB for improved gait nadeem and functional mobility.  MW      STG 3 Progress  Ongoing  MW      STG 4  Pt. to perform 2 sit to stands within 30 seconds without UE A for improved functional mobility.    MW      STG 4 " Progress  Ongoing  MW      STG 5  Patient to improve FGA score to >/= 11/30 to decrease client's risk of falls.  MW      STG 5 Progress  Ongoing  MW                Long Term Goals    LTG Date to Achieve  10/17/19  MW      LTG 1  Patient to improve NAVAS balance score to >/= 38/56 to decrease client's risk of falls.  MW      LTG 1 Progress  Ongoing  MW      LTG 2  Patient to perform TUG within 15 sec without LOB for improved functional mobility.  MW      LTG 2 Progress  Ongoing  MW      LTG 3  Patient to ambulate 10 meters without AD within 14 sec without LOB for improved gait nadeem and functional mobility.  MW      LTG 3 Progress  Ongoing  MW      LTG 4  Pt. to perform 3 sit to stands within 30 seconds without UE A for improved functional mobility.    MW      LTG 4 Progress Ongoing        LTG 5  Patient to improve FGA score to >/= 17/30 to decrease client's risk of falls.  MW      LTG 5 Progress  Ongoing  MW      LTG 6  Pt. to be I with HEP.        LTG 6 Progress  Ongoing                      Plan  Therapy options: will be seen for skilled physical therapy services  Planned modality interventions: electrical stimulation/Russian stimulation  Planned therapy interventions: balance/weight-bearing training, gait training, neuromuscular re-education, strengthening, therapeutic activities, home exercise program, abdominal trunk stabilization, fine motor coordination training and motor coordination training  Frequency: 1x week  Duration in visits: 6  Plan details: Pt to continue with PT services to improve gait, balance, strength, transfers and overall functional mobility.          Visit Diagnoses:    ICD-10-CM ICD-9-CM   1. Gait difficulty R26.9 781.2   2. Balance problem R26.89 781.99       Progress toward previous goals: Not Met  PT Short Term Goals    STG Date to Achieve  08/29/19  MW      STG 1  Patient to improve NAVAS balance score to >/= 25/56 to decrease client's risk of falls.  MW      STG 1 Progress  Met   MW      STG 2  Patient to perform TUG within 17 sec without LOB for improved functional mobility.  MW      STG 2 Progress  Met  MW      STG 3  Patient to ambulate 10 meters without AD within 15 sec without LOB for improved gait nadeem and functional mobility.  MW      STG 3 Progress  Ongoing  MW      STG 4  Pt. to perform 2 sit to stands within 30 seconds without UE A for improved functional mobility.    MW      STG 4 Progress  Ongoing  MW      STG 5  Patient to improve FGA score to >/= 11/30 to decrease client's risk of falls.  MW      STG 5 Progress  Ongoing  MW                Long Term Goals    LTG Date to Achieve  10/17/19  MW      LTG 1  Patient to improve NAVAS balance score to >/= 38/56 to decrease client's risk of falls.  MW      LTG 1 Progress  Ongoing  MW      LTG 2  Patient to perform TUG within 15 sec without LOB for improved functional mobility.  MW      LTG 2 Progress  Ongoing  MW      LTG 3  Patient to ambulate 10 meters without AD within 14 sec without LOB for improved gait nadeem and functional mobility.  MW      LTG 3 Progress  Ongoing  MW      LTG 4  Pt. to perform 3 sit to stands within 30 seconds without UE A for improved functional mobility.    MW      LTG 4 Progress Ongoing  MW      LTG 5  Patient to improve FGA score to >/= 17/30 to decrease client's risk of falls.  MW      LTG 5 Progress  Ongoing  MW      LTG 6  Pt. to be I with HEP.  MW      LTG 6 Progress  Ongoing  MW            **      Recommendations: Continue as planned  Timeframe: 6 weeks  Prognosis to achieve goals: fair    PT Signature: Marj Reese, PT  [place] License #: [id number]    Based upon review of the patient's progress and continued therapy plan, it is my medical opinion that Roselia Lobo should continue physical therapy treatment at [facility]  [facility] [address] [facility] [address] [phone].    Signature: __________________________________  Isaac Torres,  MD    Timed:  Manual Therapy:         mins  62890;  Therapeutic Exercise:    10     mins  01340;     Neuromuscular Kisha:    30    mins  74560;    Therapeutic Activity:          mins  39144;     Gait Training:           mins  36609;     Ultrasound:          mins  47799;    Electrical Stimulation:         mins  12612 ( );    Untimed:  Electrical Stimulation:         mins  14646 ( );  Mechanical Traction:         mins  65755;     Timed Treatment:   40   mins   Total Treatment:     40   mins

## 2019-10-02 ENCOUNTER — TREATMENT (OUTPATIENT)
Dept: PHYSICAL THERAPY | Facility: CLINIC | Age: 79
End: 2019-10-02

## 2019-10-02 DIAGNOSIS — R26.89 BALANCE PROBLEM: ICD-10-CM

## 2019-10-02 DIAGNOSIS — R26.9 GAIT DIFFICULTY: Primary | ICD-10-CM

## 2019-10-02 PROCEDURE — 97112 NEUROMUSCULAR REEDUCATION: CPT | Performed by: PHYSICAL THERAPIST

## 2019-10-02 PROCEDURE — 97110 THERAPEUTIC EXERCISES: CPT | Performed by: PHYSICAL THERAPIST

## 2019-10-02 NOTE — PROGRESS NOTES
"Physical Therapy Daily Progress Note  Visit: 9  Date of Initial Visit: Type: THERAPY  Noted: 2019    Roselia Lobo reports: \"I fell in the shower this morning.  I'm ok\"    Subjective Evaluation    Pain  No pain reported           Objective   See Exercise, Manual, and Modality Logs for complete treatment.    Exercise 1  Exercise Name 1: NuStep; B UE/LEs  Equipment/Resistance 1: level 5  Time: 8 min  Exercise 2  Exercise Name 2: St balance without UE A with visual cueing/tactile cueing with emphasis on midline orientation  Sets/Reps 2: 20 sec hold, 5 sets; min A  Exercise 3  Exercise Name 3: St. midline orientation with visual cueing with fwd and side stepping to 6\" step  Equipment/Resistance 3: min A  Sets/Reps 3: 10  Exercise 4  Exercise Name 4: Half kneeling with R UE A if needed  Equipment/Resistance 4: min/mod A with R knee on the mat  Sets/Reps 4: 20 sec hold x 3                PT Neuro          Assessment & Plan     Assessment  Assessment details: Pt requires min A and visual cueing to stay in midline.  Pt demonstrates L lateral trunk flexion secondary to R hip weakness in standing.  Pt requires several seated rest breaks secondary to B LE fatigue/weakness.  Pt to benefit from skilled PT services to meet goals.    Plan  Plan details: Pt to continue with PT services to improve gait, balance, strength, transfers and overall functional mobility.          Manual Therapy:     0     mins  90995;  Therapeutic Exercise:     8     mins  34847;     Neuromuscular Kisha:     34    mins  91442;    Therapeutic Activity:      0     mins  73728;     Gait Trainin     mins  84405;     Ultrasound:      0     mins  24794;    Electrical Stimulation:     0     mins  12121 ( );  Dry Needling      0     mins self-pay  Traction      0     mins 66902  Canalith Repositioning    0     mins 77703      Timed Treatment:   42   mins   Total Treatment:     42   mins    Marj Reese, PT  [place] License #: " [id number]    Physical Therapist

## 2019-10-09 ENCOUNTER — TREATMENT (OUTPATIENT)
Dept: PHYSICAL THERAPY | Facility: CLINIC | Age: 79
End: 2019-10-09

## 2019-10-09 DIAGNOSIS — Z74.09 DECREASED FUNCTIONAL MOBILITY AND ENDURANCE: ICD-10-CM

## 2019-10-09 DIAGNOSIS — R27.8 DECREASED COORDINATION: ICD-10-CM

## 2019-10-09 DIAGNOSIS — Z78.9 DECREASED INDEPENDENCE WITH ACTIVITIES OF DAILY LIVING: Primary | ICD-10-CM

## 2019-10-09 PROCEDURE — 97530 THERAPEUTIC ACTIVITIES: CPT | Performed by: OCCUPATIONAL THERAPIST

## 2019-10-09 PROCEDURE — 97110 THERAPEUTIC EXERCISES: CPT | Performed by: OCCUPATIONAL THERAPIST

## 2019-10-09 NOTE — PROGRESS NOTES
OccupationalTherapy Daily Progress Note  Visits:3        Roselia Lobo reports she still gets tired easily but she tries to be careful at home.     Pain: 0/10    Subjective     Objective   See Exercise, Manual, and Modality Logs for complete treatment.       Assessment & Plan     Assessment  Assessment details: Pt marcos increased standing balance and core strength while sitting on dynamic surface. She continues to fatigue and requires compensations for daily tasks.     Plan  Plan details: Continue per POC to further address dynamic balance and overall energy and strength for ADL tasks.         Manual Therapy:          mins  91205;  Therapeutic Exercise:     15     mins  13165;     Neuromuscular Kisha:         mins  78274;    Therapeutic Activity:      30     mins  31374;     Gait Training:            mins  92084;     Ultrasound:           mins  50234;    Electrical Stimulation:          mins  40518 ( );  Dry Needling           mins self-pay  Traction           mins 66331  Canalith Repositioning         mins 85559      Timed Treatment:   45   mins   Total Treatment:     45   mins    CECY Nguyen License #: [id number]    Occupational Therapist

## 2019-10-10 ENCOUNTER — TREATMENT (OUTPATIENT)
Dept: PHYSICAL THERAPY | Facility: CLINIC | Age: 79
End: 2019-10-10

## 2019-10-10 DIAGNOSIS — R26.9 GAIT DIFFICULTY: Primary | ICD-10-CM

## 2019-10-10 DIAGNOSIS — R26.89 BALANCE PROBLEM: ICD-10-CM

## 2019-10-10 PROCEDURE — 97112 NEUROMUSCULAR REEDUCATION: CPT | Performed by: PHYSICAL THERAPIST

## 2019-10-10 PROCEDURE — 97110 THERAPEUTIC EXERCISES: CPT | Performed by: PHYSICAL THERAPIST

## 2019-10-10 NOTE — PROGRESS NOTES
"Physical Therapy Daily Progress Note  Visit: 10  Date of Initial Visit: Type: THERAPY  Noted: 7/11/2019    Roselia Lobo reports: \"I still can't seem to stand up straight.\"    Subjective Evaluation    Pain  No pain reported           Objective   See Exercise, Manual, and Modality Logs for complete treatment.    Exercise 1  Exercise Name 1: NuStep; B UE/LEs  Equipment/Resistance 1: level 6  Time: 8 min  Exercise 2  Exercise Name 2: Quad with B hip circles clockwise and counter clockwise  Equipment/Resistance 2: min A  Sets/Reps 2: 10, 2 sets  Exercise 3  Exercise Name 3: Tall kneeling with B cervical rot/flex/ext  Equipment/Resistance 3: min A  Sets/Reps 3: 10; emphasis on decreased L lateral trunk flexion  Exercise 4  Exercise Name 4: Prone B knee flexion, B hip ext with knee flexed  Equipment/Resistance 4: min A for L LE  Sets/Reps 4: 10  Exercise 5  Exercise Name 5: Supine DKC and hold up to 10 sec  Sets/Reps 5: 2  Exercise 6  Exercise Name 6: Supine single heel slide with drawing half [place] and lowering LE back down onto mat  Sets/Reps 6: 10  Exercise 7  Exercise Name 7: St balance with B UE A on TRX with alternating fwd, side and posterior stepping over half foam roll  Equipment/Resistance 7: min/mod A; B TRX, half foam roll  Sets/Reps 7: 10                PT Neuro          Assessment & Plan     Assessment  Assessment details: Pt requires min A with supine exercises for L LE.  Pt continues to demonstrate L lateral trunk flexion to compensate for R hip weakness with mod A for pt to achieve midline.  Pt to benefit from skilled PT services to improve gait, balance, strength, transfers and overall functional mobility.    Plan  Plan details: Pt to continue with PT services to improve gait, balance, strength, transfers and overall functional mobility.          Manual Therapy:     0     mins  78405;  Therapeutic Exercise:     10     mins  98015;     Neuromuscular Kisha:     35    mins  08529;    Therapeutic Activity: "      0     mins  97906;     Gait Trainin     mins  97627;     Ultrasound:      0     mins  10888;    Electrical Stimulation:     0     mins  31595 ( );  Dry Needling      0     mins self-pay  Traction      0     mins 18111  Canalith Repositioning    0     mins 94755      Timed Treatment:   45   mins   Total Treatment:     45   mins    Marj Reese, PT  [place] License #: [id number]    Physical Therapist

## 2019-10-15 ENCOUNTER — TREATMENT (OUTPATIENT)
Dept: PHYSICAL THERAPY | Facility: CLINIC | Age: 79
End: 2019-10-15

## 2019-10-15 DIAGNOSIS — Z74.09 DECREASED FUNCTIONAL MOBILITY AND ENDURANCE: ICD-10-CM

## 2019-10-15 DIAGNOSIS — R27.8 DECREASED COORDINATION: ICD-10-CM

## 2019-10-15 DIAGNOSIS — Z78.9 DECREASED INDEPENDENCE WITH ACTIVITIES OF DAILY LIVING: Primary | ICD-10-CM

## 2019-10-15 PROCEDURE — 97530 THERAPEUTIC ACTIVITIES: CPT | Performed by: OCCUPATIONAL THERAPIST

## 2019-10-15 PROCEDURE — 97110 THERAPEUTIC EXERCISES: CPT | Performed by: OCCUPATIONAL THERAPIST

## 2019-10-15 NOTE — PROGRESS NOTES
OccupationalTherapy Daily Progress Note  Visits:4        Roselia Lobo reports she really wants to get rid of her walker but she has a hard time standing upright.     Pain: 0/10    Subjective     Objective   See Exercise, Manual, and Modality Logs for complete treatment.       Assessment & Plan     Assessment  Assessment details: Pt continues to require VC for upright posture and awareness to midline. She demo impaired dynamic standing balance and overall energy and strength for tasks.     Plan  Plan details: Continue per POC to further progress toward established goals. Further address upright posture, dynamic standing balance, energy and strength for ADL tasks.           Manual Therapy:          mins  03136;  Therapeutic Exercise:     15     mins  01948;     Neuromuscular Kisha:         mins  79691;    Therapeutic Activity:      30     mins  03296;     Gait Training:            mins  10569;     Ultrasound:           mins  41292;    Electrical Stimulation:          mins  88754 ( );  Dry Needling           mins self-pay  Traction           mins 71615  Canalith Repositioning         mins 05344      Timed Treatment:   45   mins   Total Treatment:     45   mins    CECY Nguyen License #: [id number]    Occupational Therapist

## 2019-10-17 ENCOUNTER — TREATMENT (OUTPATIENT)
Dept: PHYSICAL THERAPY | Facility: CLINIC | Age: 79
End: 2019-10-17

## 2019-10-17 DIAGNOSIS — R26.89 BALANCE PROBLEM: Primary | ICD-10-CM

## 2019-10-17 DIAGNOSIS — R26.9 GAIT DIFFICULTY: ICD-10-CM

## 2019-10-17 PROCEDURE — 97110 THERAPEUTIC EXERCISES: CPT | Performed by: PHYSICAL THERAPIST

## 2019-10-17 PROCEDURE — 97112 NEUROMUSCULAR REEDUCATION: CPT | Performed by: PHYSICAL THERAPIST

## 2019-10-17 NOTE — PROGRESS NOTES
"Physical Therapy Daily Progress Note  Visit: 11  Date of Initial Visit: Type: THERAPY  Noted: 2019    Roselia Lobo reports: \"I'm doing alright.\"    Subjective Evaluation    Pain  No pain reported           Objective   See Exercise, Manual, and Modality Logs for complete treatment.    Exercise 1  Exercise Name 1: NuStep; B UE/LEs  Equipment/Resistance 1: level 6  Time: 10 min  Exercise 2  Exercise Name 2: Standing balance with B treking poles with mirror with emphasis on upright posture and alternating stepping fwd/back and sidestepping  Equipment/Resistance 2: B treking poles; min A  Sets/Reps 2: 10  Exercise 3  Exercise Name 3: Tall kneeling with B UE A on treking poles with walking fwd/bwd with emphasis on midline orientation  Equipment/Resistance 3: min A; B treking poles  Sets/Reps 3: 10; emphasis on decreased L lateral trunk flexion                PT Neuro          Assessment & Plan     Assessment  Assessment details: Pt demonstrates more upright posture with midline orientation with min A and using a mirror.  Pt continues to demonstrate L lateral trunk flexion and R trendelenburg.  Pt to benefit from skilled PT services until the end of the month.      Plan  Plan details: Pt to continue with PT services to improve gait, balance, strength, transfers and overall functional mobility.          Manual Therapy:     0     mins  80874;  Therapeutic Exercise:     10     mins  03806;     Neuromuscular Kisha:     25    mins  40099;    Therapeutic Activity:      0     mins  07532;     Gait Trainin     mins  97250;     Ultrasound:      0     mins  10116;    Electrical Stimulation:     0     mins  01071 ( );  Dry Needling      0     mins self-pay  Traction      0     mins 93403  Canalith Repositioning    0     mins 19468      Timed Treatment:   35   mins   Total Treatment:     35   mins    Marj Reese, PT  [place] License #: [id number]    Physical Therapist    "

## 2019-10-22 ENCOUNTER — TREATMENT (OUTPATIENT)
Dept: PHYSICAL THERAPY | Facility: CLINIC | Age: 79
End: 2019-10-22

## 2019-10-22 DIAGNOSIS — R27.8 DECREASED COORDINATION: ICD-10-CM

## 2019-10-22 DIAGNOSIS — Z78.9 DECREASED INDEPENDENCE WITH ACTIVITIES OF DAILY LIVING: Primary | ICD-10-CM

## 2019-10-22 DIAGNOSIS — Z74.09 DECREASED FUNCTIONAL MOBILITY AND ENDURANCE: ICD-10-CM

## 2019-10-22 PROCEDURE — 97530 THERAPEUTIC ACTIVITIES: CPT | Performed by: OCCUPATIONAL THERAPIST

## 2019-10-22 PROCEDURE — 97110 THERAPEUTIC EXERCISES: CPT | Performed by: OCCUPATIONAL THERAPIST

## 2019-10-22 NOTE — PROGRESS NOTES
OccupationalTherapy Daily Progress Note  Visits:5        Roselia Lobo reports she still loses her balance and has falls at home. She has a hard time with midline and she sometimes bends forward too long and so far that she can't get straightened back up.    Pain: 2/10    Subjective     Objective   See Exercise, Manual, and Modality Logs for complete treatment.       Assessment & Plan     Assessment  Assessment details: Pt continues to demo weakness and poor awareness to posture. She reports she doesn't use her walker much at home and demo decreased safety and balance w/o walker. She continues to demo impaired UB strength and coordination for ADL tasks.     Plan  Plan details: Continue per POC to further progress toward established goals. Further address core strength and midline to increase balance for aDL tasks.           Manual Therapy:          mins  37545;  Therapeutic Exercise:     15     mins  99780;     Neuromuscular Kisha:         mins  54193;    Therapeutic Activity:      30     mins  18110;     Gait Training:            mins  26739;     Ultrasound:           mins  28304;    Electrical Stimulation:          mins  88491 ( );  Dry Needling           mins self-pay  Traction           mins 79704  Canalith Repositioning         mins 57327      Timed Treatment:   45   mins   Total Treatment:     45   mins    CECY Nguyen  [place] License #: [id number]    Occupational Therapist

## 2019-10-24 ENCOUNTER — TREATMENT (OUTPATIENT)
Dept: PHYSICAL THERAPY | Facility: CLINIC | Age: 79
End: 2019-10-24

## 2019-10-24 PROCEDURE — 97112 NEUROMUSCULAR REEDUCATION: CPT | Performed by: PHYSICAL THERAPIST

## 2019-10-24 PROCEDURE — 97116 GAIT TRAINING THERAPY: CPT | Performed by: PHYSICAL THERAPIST

## 2019-10-24 PROCEDURE — 97110 THERAPEUTIC EXERCISES: CPT | Performed by: PHYSICAL THERAPIST

## 2019-10-24 NOTE — PROGRESS NOTES
"Physical Therapy Daily Progress Note  Visit: 12  Date of Initial Visit: Type: THERAPY  Noted: 7/11/2019    Roselia Lobo reports: \"I'm doing alright.  My legs were tired before I got here.\"    Subjective Evaluation    Pain  No pain reported           Objective   See Exercise, Manual, and Modality Logs for complete treatment.    Exercise 1  Exercise Name 1: NuStep; B UE/LEs  Equipment/Resistance 1: level 6  Time: 10 min  Exercise 2  Exercise Name 2: Sitting balance on swiss ball with B LE marching, TKE, hip ab/adduction, marching  Equipment/Resistance 2: min/mod A  Sets/Reps 2: 10  Exercise 3  Exercise Name 3: Quad opposite UE/LEs  Equipment/Resistance 3: mod A  Sets/Reps 3: 5  Exercise 4  Exercise Name 4: Ambulation with RW with empahsis on decreased L shoulder elevation and L lateral trunk flexion  Equipment/Resistance 4: RW; min A  Sets/Reps 4: 50' x 2  Exercise 5  Exercise Name 5: St balance on blue foam with EC with B cervical rot/flex/ext  Equipment/Resistance 5: mod/max A; LOB 80% of the time.  Sets/Reps 5: 10                PT Neuro          Assessment & Plan     Assessment  Assessment details: Pt requires mod/max A with standing balance on blue foam with EC with LOB up to 80% of the time.  Pt demonstrates L lateral trunk flexion with seated rest breaks.  Pt to benefit from skilled PT services for one additional therapy.    Plan  Plan details: Pt to continue with PT services for one additional visit to improve gait, balance, strength, transfers and overall functional mobility.          Manual Therapy:     0     mins  62942;  Therapeutic Exercise:     15     mins  75507;     Neuromuscular Kisha:     15    mins  55136;    Therapeutic Activity:      0     mins  82253;     Gait Training:       10     mins  99349;     Ultrasound:      0     mins  18011;    Electrical Stimulation:     0     mins  26684 ( );  Dry Needling      0     mins self-pay  Traction      0     mins 74236  Canalith Repositioning    0     " mins 30359      Timed Treatment:   40   mins   Total Treatment:     40   mins    Marj Reese, PT  [place] License #: [id number]    Physical Therapist

## 2019-10-29 ENCOUNTER — TREATMENT (OUTPATIENT)
Dept: PHYSICAL THERAPY | Facility: CLINIC | Age: 79
End: 2019-10-29

## 2019-10-29 DIAGNOSIS — Z74.09 DECREASED FUNCTIONAL MOBILITY AND ENDURANCE: ICD-10-CM

## 2019-10-29 DIAGNOSIS — R27.8 DECREASED COORDINATION: ICD-10-CM

## 2019-10-29 DIAGNOSIS — Z78.9 DECREASED INDEPENDENCE WITH ACTIVITIES OF DAILY LIVING: Primary | ICD-10-CM

## 2019-10-29 PROCEDURE — 97530 THERAPEUTIC ACTIVITIES: CPT | Performed by: OCCUPATIONAL THERAPIST

## 2019-10-29 NOTE — PROGRESS NOTES
OccupationalTherapy Discharge Note  Visits:6        Roselia Lobo reports she had a fall out of her bed on Saturday and hasn't been getting around very well. She reports she hasn't been on her Ampyra for at least 2 months. She states she plans to go to the doctor's office tomorrow if she still isn't feeling well. She reports she has been tearful this past weekend and just not feeling well overall.     Pt educated in bed rails options to increase safety and independence for bed mobility and sit-stand from EOB. OT called doctor's office who is looking into her Ampyra to restart it.     Pain: 8/10    Subjective     Objective   See Exercise, Manual, and Modality Logs for complete treatment.       Assessment & Plan     Assessment  Assessment details: Pt is being discharged this date - no measurements taken this date d/t pain and focus on education for safety at home.     Plan  Plan details: Discharge from OT. Follow up with MD regarding current pain from fall and neurologist regarding medications.         Manual Therapy:          mins  96027;  Therapeutic Exercise:          mins  00237;     Neuromuscular Kisha:         mins  21801;    Therapeutic Activity:      15     mins  13064;     Gait Training:            mins  57241;     Ultrasound:           mins  64030;    Electrical Stimulation:          mins  24521 ( );  Dry Needling           mins self-pay  Traction           mins 50183  Canalith Repositioning         mins 16256      Timed Treatment:   15   mins   Total Treatment:     15   mins    CECY Nguyen License #: [id number]    Occupational Therapist

## 2019-10-31 ENCOUNTER — DOCUMENTATION (OUTPATIENT)
Dept: PHYSICAL THERAPY | Facility: CLINIC | Age: 79
End: 2019-10-31

## 2019-10-31 NOTE — PROGRESS NOTES
Discharge Summary  Discharge Summary from Physical Therapy Report      Dates  PT visit: 7/11/2019-10/24/2019  Number of Visits: 12     Discharge Status of Patient: Continue with HEP.  Pt has had recent falls and determined that pt was not taking Ampyra.  Called MD who is working on getting a new script out to the patient.    Goals:  Partially Met    Discharge Plan: Continue with current home exercise program as instructed    Comments: Pt to continue with HEP upon discharge    Date of Discharge 10/31/2019        Marj Reese, PT  Physical Therapist

## 2019-11-27 RX ORDER — PREGABALIN 200 MG/1
CAPSULE ORAL
Qty: 60 CAPSULE | Refills: 5 | Status: SHIPPED | OUTPATIENT
Start: 2019-11-27 | End: 2020-01-01 | Stop reason: SDUPTHER

## 2020-01-01 ENCOUNTER — TELEPHONE (OUTPATIENT)
Dept: NEUROLOGY | Facility: CLINIC | Age: 80
End: 2020-01-01

## 2020-01-01 ENCOUNTER — LAB REQUISITION (OUTPATIENT)
Dept: LAB | Facility: HOSPITAL | Age: 80
End: 2020-01-01

## 2020-01-01 ENCOUNTER — TREATMENT (OUTPATIENT)
Dept: PHYSICAL THERAPY | Facility: CLINIC | Age: 80
End: 2020-01-01

## 2020-01-01 ENCOUNTER — LAB (OUTPATIENT)
Dept: LAB | Facility: HOSPITAL | Age: 80
End: 2020-01-01

## 2020-01-01 ENCOUNTER — SPECIALTY PHARMACY (OUTPATIENT)
Dept: ONCOLOGY | Facility: HOSPITAL | Age: 80
End: 2020-01-01

## 2020-01-01 ENCOUNTER — OFFICE VISIT (OUTPATIENT)
Dept: NEUROLOGY | Facility: CLINIC | Age: 80
End: 2020-01-01

## 2020-01-01 VITALS
OXYGEN SATURATION: 95 % | TEMPERATURE: 96.4 F | HEIGHT: 65 IN | WEIGHT: 130 LBS | SYSTOLIC BLOOD PRESSURE: 140 MMHG | HEART RATE: 85 BPM | DIASTOLIC BLOOD PRESSURE: 60 MMHG | BODY MASS INDEX: 21.66 KG/M2

## 2020-01-01 VITALS
BODY MASS INDEX: 19.86 KG/M2 | HEIGHT: 65 IN | OXYGEN SATURATION: 91 % | DIASTOLIC BLOOD PRESSURE: 74 MMHG | TEMPERATURE: 96.4 F | HEART RATE: 72 BPM | WEIGHT: 119.2 LBS | SYSTOLIC BLOOD PRESSURE: 121 MMHG

## 2020-01-01 DIAGNOSIS — R29.898 DECREASED GRIP STRENGTH: ICD-10-CM

## 2020-01-01 DIAGNOSIS — R27.8 DECREASED COORDINATION: Primary | ICD-10-CM

## 2020-01-01 DIAGNOSIS — G35 MULTIPLE SCLEROSIS (HCC): Primary | ICD-10-CM

## 2020-01-01 DIAGNOSIS — F33.40 RECURRENT MAJOR DEPRESSIVE DISORDER, IN REMISSION (HCC): ICD-10-CM

## 2020-01-01 DIAGNOSIS — R26.9 GAIT DIFFICULTY: Primary | ICD-10-CM

## 2020-01-01 DIAGNOSIS — R26.89 BALANCE PROBLEM: ICD-10-CM

## 2020-01-01 DIAGNOSIS — G35 MULTIPLE SCLEROSIS (HCC): ICD-10-CM

## 2020-01-01 DIAGNOSIS — Z00.00 ROUTINE GENERAL MEDICAL EXAMINATION AT A HEALTH CARE FACILITY: ICD-10-CM

## 2020-01-01 DIAGNOSIS — M79.2 NEUROPATHIC PAIN: ICD-10-CM

## 2020-01-01 DIAGNOSIS — M21.372 FOOT DROP, LEFT: ICD-10-CM

## 2020-01-01 LAB
ALBUMIN SERPL-MCNC: 4.4 G/DL (ref 3.5–5.2)
ALBUMIN/GLOB SERPL: 1.8 G/DL
ALP SERPL-CCNC: 33 U/L (ref 39–117)
ALT SERPL-CCNC: 18 U/L (ref 1–33)
AST SERPL-CCNC: 23 U/L (ref 1–32)
BASOPHILS # BLD AUTO: 0.07 10*3/MM3 (ref 0–0.2)
BASOPHILS NFR BLD AUTO: 1.2 % (ref 0–1.5)
BILIRUB SERPL-MCNC: 0.3 MG/DL (ref 0.2–1.2)
BUN SERPL-MCNC: 12 MG/DL (ref 8–23)
BUN/CREAT SERPL: 17.9 (ref 7–25)
CALCIUM SERPL-MCNC: 9.9 MG/DL (ref 8.6–10.5)
CHLORIDE SERPL-SCNC: 103 MMOL/L (ref 98–107)
CO2 SERPL-SCNC: 29.5 MMOL/L (ref 22–29)
CREAT SERPL-MCNC: 0.67 MG/DL (ref 0.57–1)
EOSINOPHIL # BLD AUTO: 0.13 10*3/MM3 (ref 0–0.4)
EOSINOPHIL NFR BLD AUTO: 2.2 % (ref 0.3–6.2)
ERYTHROCYTE [DISTWIDTH] IN BLOOD BY AUTOMATED COUNT: 12.4 % (ref 12.3–15.4)
GLOBULIN SER CALC-MCNC: 2.4 GM/DL
GLUCOSE SERPL-MCNC: 79 MG/DL (ref 65–99)
HCT VFR BLD AUTO: 38.1 % (ref 34–46.6)
HGB BLD-MCNC: 12.9 G/DL (ref 12–15.9)
IMM GRANULOCYTES # BLD AUTO: 0.01 10*3/MM3 (ref 0–0.05)
IMM GRANULOCYTES NFR BLD AUTO: 0.2 % (ref 0–0.5)
LYMPHOCYTES # BLD AUTO: 1.72 10*3/MM3 (ref 0.7–3.1)
LYMPHOCYTES NFR BLD AUTO: 29.2 % (ref 19.6–45.3)
MCH RBC QN AUTO: 29.7 PG (ref 26.6–33)
MCHC RBC AUTO-ENTMCNC: 33.9 G/DL (ref 31.5–35.7)
MCV RBC AUTO: 87.6 FL (ref 79–97)
MONOCYTES # BLD AUTO: 0.63 10*3/MM3 (ref 0.1–0.9)
MONOCYTES NFR BLD AUTO: 10.7 % (ref 5–12)
NEUTROPHILS # BLD AUTO: 3.34 10*3/MM3 (ref 1.7–7)
NEUTROPHILS NFR BLD AUTO: 56.5 % (ref 42.7–76)
NRBC BLD AUTO-RTO: 0 /100 WBC (ref 0–0.2)
PLATELET # BLD AUTO: 320 10*3/MM3 (ref 140–450)
POTASSIUM SERPL-SCNC: 4.7 MMOL/L (ref 3.5–5.2)
PROT SERPL-MCNC: 6.8 G/DL (ref 6–8.5)
RBC # BLD AUTO: 4.35 10*6/MM3 (ref 3.77–5.28)
SODIUM SERPL-SCNC: 142 MMOL/L (ref 136–145)
WBC # BLD AUTO: 5.9 10*3/MM3 (ref 3.4–10.8)

## 2020-01-01 PROCEDURE — 97112 NEUROMUSCULAR REEDUCATION: CPT | Performed by: PHYSICAL THERAPIST

## 2020-01-01 PROCEDURE — 97110 THERAPEUTIC EXERCISES: CPT | Performed by: PHYSICAL THERAPIST

## 2020-01-01 PROCEDURE — 36415 COLL VENOUS BLD VENIPUNCTURE: CPT | Performed by: PSYCHIATRY & NEUROLOGY

## 2020-01-01 PROCEDURE — 97530 THERAPEUTIC ACTIVITIES: CPT | Performed by: PHYSICAL THERAPIST

## 2020-01-01 PROCEDURE — 97116 GAIT TRAINING THERAPY: CPT | Performed by: PHYSICAL THERAPIST

## 2020-01-01 PROCEDURE — 97110 THERAPEUTIC EXERCISES: CPT | Performed by: OCCUPATIONAL THERAPIST

## 2020-01-01 PROCEDURE — 99214 OFFICE O/P EST MOD 30 MIN: CPT | Performed by: PSYCHIATRY & NEUROLOGY

## 2020-01-01 PROCEDURE — 97162 PT EVAL MOD COMPLEX 30 MIN: CPT | Performed by: PHYSICAL THERAPIST

## 2020-01-01 PROCEDURE — 97530 THERAPEUTIC ACTIVITIES: CPT | Performed by: OCCUPATIONAL THERAPIST

## 2020-01-01 PROCEDURE — 97165 OT EVAL LOW COMPLEX 30 MIN: CPT | Performed by: OCCUPATIONAL THERAPIST

## 2020-01-01 RX ORDER — PREGABALIN 200 MG/1
200 CAPSULE ORAL 2 TIMES DAILY
Qty: 60 CAPSULE | Refills: 5 | Status: SHIPPED | OUTPATIENT
Start: 2020-01-01 | End: 2020-01-01

## 2020-01-01 RX ORDER — PREGABALIN 200 MG/1
CAPSULE ORAL
Qty: 60 CAPSULE | Refills: 5 | Status: SHIPPED | OUTPATIENT
Start: 2020-01-01

## 2020-01-01 RX ORDER — RENAGEL 800 MG/1
TABLET ORAL
Qty: 30 TABLET | OUTPATIENT
Start: 2020-01-01

## 2020-01-01 RX ORDER — DALFAMPRIDINE 10 MG/1
10 TABLET, FILM COATED, EXTENDED RELEASE ORAL 2 TIMES DAILY
Qty: 60 TABLET | Refills: 11 | Status: SHIPPED | OUTPATIENT
Start: 2020-01-01

## 2020-05-20 PROBLEM — G62.9 POLYNEUROPATHY: Status: RESOLVED | Noted: 2018-12-13 | Resolved: 2020-01-01

## 2020-05-20 NOTE — ASSESSMENT & PLAN NOTE
Hold Aubagio due to pt Covid risk ractors      Pt reports she is no longer taking Ampyra    CBC,CMP

## 2020-05-20 NOTE — PROGRESS NOTES
Subjective:   Chief Complaint   Patient presents with   • Multiple Sclerosis     6 month follow up        Patient ID: Roselia Lobo is a 79 y.o. female.    History of Present Illness     79 y.o.  woman with RRMS, MDD and neuropathic pain returns in follow up.  Last visit on 8/6/19 continued Aubagio, Celexa, Ampyra, and Lyrica.     6/27/19:  CBC,CMP - NCS    MRI Brain  7/8/19 as compared to 5/11/18 no new/enlarging/enhancing lesions, moderate lesion load, incidental right para falcine meningioma and intraosseous hemangioma right parietal bone at the vertex posteriorly    RRMS    Using rolling walker for balance.        Fatigue is severe.      Tolerating Aubagio without side effects.     Ampyra improves balance and walking speed.          MDD    Mood stable on Celexa.     Neuropathic Pain    Lyrica improving burning in legs.            Past Medical History:   Diagnosis Date   • Depression    • Multiple sclerosis (CMS/HCC)      · Assessed By: Isaac Torres (Neurology); Last Assessed: 11 Apr 2013           The following portions of the patient's history were reviewed and updated as appropriate: allergies, current medications, past medical history, past surgical history and problem list.    Review of Systems   Constitutional: Positive for fatigue. Negative for activity change and unexpected weight change.   HENT: Negative for tinnitus and trouble swallowing.    Eyes: Negative for photophobia and visual disturbance.   Respiratory: Negative for apnea and choking.    Cardiovascular: Negative for leg swelling.   Endocrine: Positive for heat intolerance. Negative for cold intolerance.   Genitourinary: Negative for difficulty urinating, frequency, menstrual problem and urgency.   Musculoskeletal: Positive for gait problem. Negative for back pain.   Skin: Negative for color change.   Allergic/Immunologic: Negative for immunocompromised state.   Neurological: Positive for tremors and speech difficulty. Negative for dizziness,  "seizures, syncope, facial asymmetry and light-headedness.   Hematological: Negative for adenopathy. Does not bruise/bleed easily.   Psychiatric/Behavioral: Positive for decreased concentration. Negative for behavioral problems, confusion, hallucinations and sleep disturbance.        Objective:  Vitals:    05/20/20 1304   BP: 121/74   Pulse: 72   Temp: 96.4 °F (35.8 °C)   SpO2: 91%   Weight: 54.1 kg (119 lb 3.2 oz)   Height: 165.1 cm (65\")       Neurologic Exam     Mental Status   Attention: normal. Concentration: normal.   Level of consciousness: alert  Knowledge: good and consistent with education.   Normal comprehension.     Cranial Nerves     CN II   Visual fields full to confrontation.   Visual acuity: normal  Right visual field deficit: none  Left visual field deficit: none     CN III, IV, VI   Nystagmus: none   Diplopia: none  Ophthalmoparesis: none  Upgaze: normal  Downgaze: normal  Conjugate gaze: present    CN V   Facial sensation intact.   Right corneal reflex: normal  Left corneal reflex: normal    CN VII   Right facial weakness: none  Left facial weakness: none    CN VIII   Hearing: intact    CN IX, X   Palate: symmetric  Right gag reflex: normal  Left gag reflex: normal    CN XI   Right sternocleidomastoid strength: normal  Left sternocleidomastoid strength: normal    CN XII   Tongue: not atrophic  Fasciculations: absent  Tongue deviation: none    Motor Exam   Muscle bulk: normal  Overall muscle tone: normal  Right arm tone: normal  Left arm tone: normal  Right leg tone: normal  Left leg tone: normal    Strength   Right neck flexion: 5/5  Left neck flexion: 5/5  Right neck extension: 5/5  Left neck extension: 5/5  Right deltoid: 5/5  Left deltoid: 5/5  Right biceps: 5/5  Left biceps: 5/5  Right triceps: 5/5  Left triceps: 5/5  Right wrist flexion: 5/5  Left wrist flexion: 5/5  Right wrist extension: 5/5  Left wrist extension: 5/5  Right interossei: 5/5  Left interossei: 5/5  Right abdominals: 5/5  Left " abdominals: 5/5  Right iliopsoas: 5/5  Left iliopsoas: 5/5  Right quadriceps: 5/5  Left quadriceps: 5/5  Right hamstrin/5  Left hamstrin/5  Right glutei: 5/5  Left glutei: 5/5  Right anterior tibial: 5/5  Left anterior tibial: 5/5  Right posterior tibial: 5/5  Left posterior tibial: 5/5  Right peroneal: 5/5  Left peroneal: 5/5  Right gastroc: 5/5  Left gastroc: 5/5    Sensory Exam   Right arm light touch: normal  Left arm light touch: normal  Right leg light touch: decreased from knee  Left leg light touch: decreased from knee  Right arm pinprick: normal  Left arm pinprick: normal  Right leg pinprick: decreased from knee  Left leg pinprick: decreased from knee    Gait, Coordination, and Reflexes     Gait  Gait: circumduction, shuffling and wide-based (right leg)    Coordination   Romberg: positive  Tandem walking coordination: abnormal    Tremor   Resting tremor: absent  Intention tremor: absent  Action tremor: absent    Reflexes   Reflexes 2+ except as noted.   Right patellar: 0  Left patellar: 0  Right achilles: 0  Left achilles: 0      Physical Exam   Constitutional: She appears well-developed and well-nourished.   Neurological: She has an abnormal Romberg Test and an abnormal Tandem Gait Test.   Reflex Scores:       Patellar reflexes are 0 on the right side and 0 on the left side.       Achilles reflexes are 0 on the right side and 0 on the left side.  Nursing note and vitals reviewed.    Office Visit on 2019   Component Date Value Ref Range Status   • WBC 2019 5.22  3.40 - 10.80 10*3/mm3 Final   • RBC 2019 3.99  3.77 - 5.28 10*6/mm3 Final   • Hemoglobin 2019 11.8* 12.0 - 15.9 g/dL Final   • Hematocrit 2019 38.1  34.0 - 46.6 % Final   • MCV 2019 95.5  79.0 - 97.0 fL Final   • MCH 2019 29.6  26.6 - 33.0 pg Final   • MCHC 2019 31.0* 31.5 - 35.7 g/dL Final   • RDW 2019 13.1  12.3 - 15.4 % Final   • Platelets 2019 257  140 - 450 10*3/mm3 Final   •  Neutrophil Rel % 06/27/2019 56.6  42.7 - 76.0 % Final   • Lymphocyte Rel % 06/27/2019 30.5  19.6 - 45.3 % Final   • Monocyte Rel % 06/27/2019 7.9  5.0 - 12.0 % Final   • Eosinophil Rel % 06/27/2019 3.1  0.3 - 6.2 % Final   • Basophil Rel % 06/27/2019 1.5  0.0 - 1.5 % Final   • Neutrophils Absolute 06/27/2019 2.96  1.70 - 7.00 10*3/mm3 Final   • Lymphocytes Absolute 06/27/2019 1.59  0.70 - 3.10 10*3/mm3 Final   • Monocytes Absolute 06/27/2019 0.41  0.10 - 0.90 10*3/mm3 Final   • Eosinophils Absolute 06/27/2019 0.16  0.00 - 0.40 10*3/mm3 Final   • Basophils Absolute 06/27/2019 0.08  0.00 - 0.20 10*3/mm3 Final   • Immature Granulocyte Rel % 06/27/2019 0.4  0.0 - 0.5 % Final   • Immature Grans Absolute 06/27/2019 0.02  0.00 - 0.05 10*3/mm3 Final   • nRBC 06/27/2019 0.0  0.0 - 0.2 /100 WBC Final   • Glucose 06/27/2019 84  65 - 99 mg/dL Final   • BUN 06/27/2019 13  8 - 23 mg/dL Final   • Creatinine 06/27/2019 0.71  0.57 - 1.00 mg/dL Final   • eGFR Non African Am 06/27/2019 80  >60 mL/min/1.73 Final    Comment: The MDRD GFR formula is only valid for adults with stable  renal function between ages 18 and 70.     • eGFR  Am 06/27/2019 97  >60 mL/min/1.73 Final   • BUN/Creatinine Ratio 06/27/2019 18.3  7.0 - 25.0 Final   • Sodium 06/27/2019 144  136 - 145 mmol/L Final   • Potassium 06/27/2019 4.2  3.5 - 5.2 mmol/L Final   • Chloride 06/27/2019 105  98 - 107 mmol/L Final   • Total CO2 06/27/2019 27.6  22.0 - 29.0 mmol/L Final   • Calcium 06/27/2019 9.8  8.6 - 10.5 mg/dL Final   • Total Protein 06/27/2019 6.3  6.0 - 8.5 g/dL Final   • Albumin 06/27/2019 4.10  3.50 - 5.20 g/dL Final   • Globulin 06/27/2019 2.2  gm/dL Final   • A/G Ratio 06/27/2019 1.9  g/dL Final   • Total Bilirubin 06/27/2019 0.4  0.2 - 1.2 mg/dL Final   • Alkaline Phosphatase 06/27/2019 30* 39 - 117 U/L Final   • AST (SGOT) 06/27/2019 20  1 - 32 U/L Final   • ALT (SGPT) 06/27/2019 13  1 - 33 U/L Final         Assessment/Plan:       Problems Addressed  this Visit        Nervous and Auditory    Multiple sclerosis (CMS/HCC) - Primary     Hold Aubagio due to pt Covid risk ractors      Pt reports she is no longer taking Ampyra    CBC,CMP                 Relevant Medications    pregabalin (LYRICA) 200 MG capsule    Other Relevant Orders    CBC & Differential    Comprehensive Metabolic Panel    Neuropathic pain     Sx persistent on Lyrica             Other    Depression     Psychological condition is unchanged.  Continue current treatment regimen.  Psychological condition  will be reassessed at the next regular appointment.

## 2020-08-28 NOTE — TELEPHONE ENCOUNTER
Returned call to [facility] and relayed that patient's Aubagio was put on hold per MD as of appt on 5/20/2020 due to Covid risk factors and would be reevaluated at next F/U appt scheduled for 11/23/2020.

## 2020-08-28 NOTE — TELEPHONE ENCOUNTER
[facility] CALL[facility] TO SEE IF THEY CAN GET A REFILL ORDER FOR PT ON THE MEDICATION AUBAGIO DUE TO IT WAS PUT ON HOLD IN 5/202 DUE TO THE COVID. PLEASE CALL HER BACK [phone]

## 2020-11-23 NOTE — PROGRESS NOTES
Subjective:   Chief Complaint   Patient presents with   • Multiple Sclerosis [facility]       Patient ID: Roselia Lobo is a 79 y.o. female.    History of Present Illness     79 y.o.  woman with RRMS, MDD and neuropathic pain returns in follow up.  Last visit on 5/20/20 continued off DMT, Celexa, Lyrica, ordered labs..     5/21/20:  CBC,CMP - NCS    MRI Brain  7/8/19 as compared to 5/11/18 no new/enlarging/enhancing lesions, moderate lesion load, incidental right para falcine meningioma and intraosseous hemangioma right parietal bone at the vertex posteriorly    RRMS    Falling five times a month.  Stopped Ampyra earlier this year.     Using rolling walker for balance.        Fatigue is severe sleeping most of the day.            MDD    Mood stable off SSRI    Neuropathic Pain    Lyrica improving burning in legs.            Past Medical History:   Diagnosis Date   • Depression    • Multiple sclerosis (CMS/HCC)      · Assessed By: Isaac Torres (Neurology); Last Assessed: 11 Apr 2013           The following portions of the patient's history were reviewed and updated as appropriate: allergies, current medications, past medical history, past surgical history and problem list.    Review of Systems   Constitutional: Positive for fatigue. Negative for activity change and unexpected weight change.   HENT: Negative for tinnitus and trouble swallowing.    Eyes: Negative for photophobia and visual disturbance.   Respiratory: Negative for apnea and choking.    Cardiovascular: Negative for leg swelling.   Endocrine: Positive for heat intolerance. Negative for cold intolerance.   Genitourinary: Negative for difficulty urinating, frequency, menstrual problem and urgency.   Musculoskeletal: Positive for gait problem. Negative for back pain.   Skin: Negative for color change.   Allergic/Immunologic: Negative for immunocompromised state.   Neurological: Positive for tremors and speech difficulty. Negative for dizziness, seizures,  "syncope, facial asymmetry and light-headedness.   Hematological: Negative for adenopathy. Does not bruise/bleed easily.   Psychiatric/Behavioral: Positive for decreased concentration. Negative for behavioral problems, confusion, hallucinations and sleep disturbance.        Objective:  Vitals:    11/23/20 1406   BP: 140/60   Pulse: 85   Temp: 96.4 °F (35.8 °C)   SpO2: 95%   Weight: 59 kg (130 lb)   Height: 165.1 cm (65\")       Neurologic Exam     Mental Status   Attention: normal. Concentration: normal.   Level of consciousness: alert  Knowledge: good and consistent with education.   Normal comprehension.     Cranial Nerves     CN II   Visual fields full to confrontation.   Visual acuity: normal  Right visual field deficit: none  Left visual field deficit: none     CN III, IV, VI   Nystagmus: none   Diplopia: none  Ophthalmoparesis: none  Upgaze: normal  Downgaze: normal  Conjugate gaze: present    CN V   Facial sensation intact.   Right corneal reflex: normal  Left corneal reflex: normal    CN VII   Right facial weakness: none  Left facial weakness: none    CN VIII   Hearing: intact    CN IX, X   Palate: symmetric  Right gag reflex: normal  Left gag reflex: normal    CN XI   Right sternocleidomastoid strength: normal  Left sternocleidomastoid strength: normal    CN XII   Tongue: not atrophic  Fasciculations: absent  Tongue deviation: none    Motor Exam   Muscle bulk: normal  Overall muscle tone: normal  Right arm tone: normal  Left arm tone: normal  Right leg tone: normal  Left leg tone: normal    Strength   Right neck flexion: 5/5  Left neck flexion: 5/5  Right neck extension: 5/5  Left neck extension: 5/5  Right deltoid: 5/5  Left deltoid: 5/5  Right biceps: 5/5  Left biceps: 5/5  Right triceps: 5/5  Left triceps: 5/5  Right wrist flexion: 5/5  Left wrist flexion: 5/5  Right wrist extension: 5/5  Left wrist extension: 5/5  Right interossei: 5/5  Left interossei: 5/5  Right abdominals: 5/5  Left abdominals: " 5/5  Right iliopsoas: 5/5  Left iliopsoas: 5/5  Right quadriceps: 5/5  Left quadriceps: 5/5  Right hamstrin/5  Left hamstrin/5  Right glutei: 5/5  Left glutei: 5/5  Right anterior tibial: 5/5  Left anterior tibial: 5/5  Right posterior tibial: 5/5  Left posterior tibial: 5/5  Right peroneal: 5/5  Left peroneal: 5/5  Right gastroc: 5/5  Left gastroc: 5/5    Sensory Exam   Right arm light touch: normal  Left arm light touch: normal  Right leg light touch: decreased from knee  Left leg light touch: decreased from knee  Right arm pinprick: normal  Left arm pinprick: normal  Right leg pinprick: decreased from knee  Left leg pinprick: decreased from knee    Gait, Coordination, and Reflexes     Gait  Gait: circumduction, shuffling and wide-based (right leg)    Coordination   Romberg: positive  Tandem walking coordination: abnormal    Tremor   Resting tremor: absent  Intention tremor: absent  Action tremor: absent    Reflexes   Reflexes 2+ except as noted.   Right patellar: 0  Left patellar: 0  Right achilles: 0  Left achilles: 0      Physical Exam   Constitutional: She appears well-developed.   Neurological: She has an abnormal Romberg Test and an abnormal Tandem Gait Test.   Reflex Scores:       Patellar reflexes are 0 on the right side and 0 on the left side.       Achilles reflexes are 0 on the right side and 0 on the left side.  Nursing note and vitals reviewed.    Lab on 2020   Component Date Value Ref Range Status   • Glucose 2020 79  65 - 99 mg/dL Final   • BUN 2020 12  8 - 23 mg/dL Final   • Creatinine 2020 0.67  0.57 - 1.00 mg/dL Final   • eGFR Non African Am 2020 85  >60 mL/min/1.73 Final    Comment: The MDRD GFR formula is only valid for adults with stable  renal function between ages 18 and 70.     • eGFR  Am 2020 103  >60 mL/min/1.73 Final   • BUN/Creatinine Ratio 2020 17.9  7.0 - 25.0 Final   • Sodium 2020 142  136 - 145 mmol/L Final   • Potassium  05/20/2020 4.7  3.5 - 5.2 mmol/L Final   • Chloride 05/20/2020 103  98 - 107 mmol/L Final   • Total CO2 05/20/2020 29.5* 22.0 - 29.0 mmol/L Final   • Calcium 05/20/2020 9.9  8.6 - 10.5 mg/dL Final   • Total Protein 05/20/2020 6.8  6.0 - 8.5 g/dL Final   • Albumin 05/20/2020 4.40  3.50 - 5.20 g/dL Final   • Globulin 05/20/2020 2.4  gm/dL Final   • A/G Ratio 05/20/2020 1.8  g/dL Final   • Total Bilirubin 05/20/2020 0.3  0.2 - 1.2 mg/dL Final   • Alkaline Phosphatase 05/20/2020 33* 39 - 117 U/L Final   • AST (SGOT) 05/20/2020 23  1 - 32 U/L Final   • ALT (SGPT) 05/20/2020 18  1 - 33 U/L Final   • WBC 05/20/2020 5.90  3.40 - 10.80 10*3/mm3 Final   • RBC 05/20/2020 4.35  3.77 - 5.28 10*6/mm3 Final   • Hemoglobin 05/20/2020 12.9  12.0 - 15.9 g/dL Final   • Hematocrit 05/20/2020 38.1  34.0 - 46.6 % Final   • MCV 05/20/2020 87.6  79.0 - 97.0 fL Final   • MCH 05/20/2020 29.7  26.6 - 33.0 pg Final   • MCHC 05/20/2020 33.9  31.5 - 35.7 g/dL Final   • RDW 05/20/2020 12.4  12.3 - 15.4 % Final   • Platelets 05/20/2020 320  140 - 450 10*3/mm3 Final   • Neutrophil Rel % 05/20/2020 56.5  42.7 - 76.0 % Final   • Lymphocyte Rel % 05/20/2020 29.2  19.6 - 45.3 % Final   • Monocyte Rel % 05/20/2020 10.7  5.0 - 12.0 % Final   • Eosinophil Rel % 05/20/2020 2.2  0.3 - 6.2 % Final   • Basophil Rel % 05/20/2020 1.2  0.0 - 1.5 % Final   • Neutrophils Absolute 05/20/2020 3.34  1.70 - 7.00 10*3/mm3 Final   • Lymphocytes Absolute 05/20/2020 1.72  0.70 - 3.10 10*3/mm3 Final   • Monocytes Absolute 05/20/2020 0.63  0.10 - 0.90 10*3/mm3 Final   • Eosinophils Absolute 05/20/2020 0.13  0.00 - 0.40 10*3/mm3 Final   • Basophils Absolute 05/20/2020 0.07  0.00 - 0.20 10*3/mm3 Final   • Immature Granulocyte Rel % 05/20/2020 0.2  0.0 - 0.5 % Final   • Immature Grans Absolute 05/20/2020 0.01  0.00 - 0.05 10*3/mm3 Final   • nRBC 05/20/2020 0.0  0.0 - 0.2 /100 WBC Final         Assessment/Plan:       Problems Addressed this Visit        Nervous and Auditory     Multiple sclerosis (CMS/HCC) - Primary     Gait worsening off Ampyra    Restart Ampyra     T25FW 11.12 sec     Refer to PT and OT     Remain off DMT          Relevant Orders    Ambulatory Referral to Physical Therapy Evaluate and treat    Ambulatory Referral to Occupational Therapy    Neuropathic pain     Sx controlled on Lyrica             Other    Depression     Psychological condition is improving with treatment.  Continue current treatment regimen.  Psychological condition  will be reassessed at the next regular appointment.           Diagnoses       Codes Comments    Multiple sclerosis (CMS/HCC)    -  Primary ICD-10-CM: G35  ICD-9-CM: 340     Neuropathic pain     ICD-10-CM: M79.2  ICD-9-CM: 729.2     Recurrent major depressive disorder, in remission (CMS/HCC)     ICD-10-CM: F33.40  ICD-9-CM: 296.35

## 2020-11-23 NOTE — ASSESSMENT & PLAN NOTE
Gait worsening off Ampyra    Restart Ampyra     T25FW 11.12 sec     Refer to PT and OT     Remain off DMT

## 2020-11-25 NOTE — PROGRESS NOTES
Oral Neurology Medication Teaching        Patient Name/:     Roselia Lobo   1940  Oral Neurology Medication Regimen:  Dalfampridine XR 10mg PO BID  Date Started Medication: pending acquisition           Initial Teaching Follow Up Comments      Safety      Storage instructions (away from children; away from heat/cold, sunlight, or moisture)       “How are you storing your medications?”, reminders on storage, proper handling (away from children, managing waste, etc.), disposal of medication with D/C or dosage change     Patient counseled on appropriate storage of medication. Store at room temp, away from pets and children. Pt verbalized understanding.       Adherence       patient and/or caregiver on how to take medication, take with/without food, assess their adherence potential, stress importance of adherence, ways to manage adherence (pill boxes, phone reminders, calendars), what to do if miss a dose    “How are you taking your medication?” “How are you remembering to take your medication?”, “How many doses have you missed?”, determine reasons for non-adherence (not remembering, side effects, etc), ways to improve, overadherence? Remind patient of ways to improve/maintain adherence    Reviewed plan for Dalfampridine XR 10mg (1 tablet) by mouth twice daily. Reviewed plan for missed doses. Pt voiced understanding. Discussed importance of compliance. Script processed at [facility] [employer] [facility] and shipped to patient.       Side Effects/Adverse Reactions       patient on potential side effects, s/s, ways to manage, when to call MD/seek help       Determine if patient experiencing side effects, ways to manage  Discussed potential side effects including but not limited to: N/V, headache, abdominal pain, insomnia, dizziness, and urinary tract infections. Discussed potential serious side effects of seizures and anaphylactic reactions.  Pt verbalized understanding.      Miscellaneous      Food interactions, DDIs,  financial issues Determine if patient started any new medications (analyze for DDI) No DDIs identified with planned medication list and Dalfampridine.       Additional Notes: Discussed aforementioned material with patient's son and daughter-in-law by phone. All questions and concerns addressed. Patient provided with my contact information and instructed to call if any additional questions should arise. Notified [facility] of new script.

## 2020-11-25 NOTE — PROGRESS NOTES
Physical Therapy Initial Evaluation and Plan of Care      Patient: Roselia Lobo   : 1940  Diagnosis/ICD-10 Code:  Gait difficulty [R26.9]  Referring practitioner: Isaac Torres MD  Date of Initial Visit: 2020  Today's Date: 2020  Patient seen for 1 sessions           Subjective Questionnaire: n/a      Subjective Evaluation    History of Present Illness  Mechanism of injury: Pt had f/u with neurology who referred her for therapy secondary to being off balance and having falls.  Pt reports having over 100 falls this past year.  Pt reports having to crawl to a chair or bed to get up off the floor.  Pts son lives approx 2 miles a way and dtr in law calls often.  Pt uses [employer] at all times and still driving.  Pt performs all of her ADL's and taking care of her home.  Neurologist would like to try the Ampyra medication again.  Pt is not exercising at home and takes small walks but mostly sitting in her recliner.      Patient Occupation: retired from accounting Quality of life: fair    Pain  Current pain ratin  At best pain ratin  At worst pain ratin  Location: low back  Progression: no change    Social Support  Lives in: one-story house (3 [address]; 1HR)  Lives with: alone    Hand dominance: right    Treatments  Previous treatment: physical therapy  Patient Goals  Patient goals for therapy: improved balance, increased strength and decreased pain  Patient goal: not fall so much           Objective          Static Posture     Comments  Pt unable to stand fully upright.  Pt presents with R lateral and fwd trunk flexion in standing with pt able to stand up to 5 sec unsupported.    Neurological Testing     Sensation     Hip   Left Hip   Intact: light touch    Right Hip   Intact: light touch    Knee   Left Knee   Intact: light touch    Right Knee   Intact: light touch     Ankle/Foot   Left Ankle/Foot   Intact: light touch and proprioception    Right Ankle/Foot   Intact: light touch and  "proprioception     Strength/Myotome Testing     Left Hip   Planes of Motion   Flexion: 3-  Extension: 2-  Abduction: 3-  Adduction: 3-    Right Hip   Planes of Motion   Flexion: 2-  Extension: 2-  Abduction: 2-  Adduction: 3-    Left Knee   Flexion: 3-  Extension: 3-    Right Knee   Flexion: 3-  Extension: 3+    Left Ankle/Foot   Dorsiflexion: 2-  Plantar flexion: 2+    Right Ankle/Foot   Dorsiflexion: 3-  Plantar flexion: 3-    Ambulation     Observational Gait   Gait: crouched   Stride length within functional limits. Decreased walking speed.   Base of support: normal    Additional Observational Gait Details  Pt pushes RW too far in front and has increased R lateral trunk flexion    Quality of Movement During Gait   Trunk  Forward lean.     Pelvis  Anterior pelvic tilt.     Ankle    Ankle (Left): Positive foot drop.     Functional Assessment     Comments  Pt performs transfers with grabbing for chair/sufaces with UE and \"diving\" onto the surface        PT Neuro         Assessment & Plan     Assessment  Impairments: abnormal coordination, abnormal gait, activity intolerance, impaired balance, impaired physical strength, lacks appropriate home exercise program, pain with function and safety issue  Assessment details: Pt presents with evolving symptoms with MS diagnosis.  Pt has an increased amount of falls since being seen last year along with impaired strength, balance, gait, and overall functional mobility.  Pt would benefit from motoroized scooter/W/C to decrease falls in home.  Pt is hesitant and reports that it would just, \"get in the way.\"  Pt to benefit from skilled PT services to improve functional impairments. Pt could also benefit from L toe-off brace for L foot drop.  Prognosis: fair  Functional Limitations: carrying objects, lifting, walking, pulling, pushing, uncomfortable because of pain, standing, stooping, reaching behind back and reaching overhead  Goals  Plan Goals: STG (6 visits)  1. Patient to " improve NAVAS balance score to >/= 18 /56 to decrease client's risk of falls.  2. Patient to perform TUG within 15 sec without LOB for improved functional mobility.  3. Patient to ambulate 10 meters with AD within 15 sec without LOB for improved gait nadeem and functional mobility.  4.  Pt to stand with single UE A and perform UE activities without LOB for 1 minute for improved activity tolerance and balance.    LTG (12 visits)  1. Patient to improve NAVAS balance score to >/= 30 /56 to decrease client's risk of falls.  2. Patient to perform TUG within 13 sec without LOB for improved functional mobility.  3. Patient to ambulate 10 meters with AD within 13 sec without LOB for improved gait nadeem and functional mobility.  4. Patient to stand without UE A and perform activity for up to 2 minutes without LOB for improved activity tolerance and balance.  5. Pt to ascend/decend 12 steps with 1 HR with either recip or non-recip pattern for improved entrance and exit into her home.  6. Patient to be I with HEP.    Plan  Therapy options: will be seen for skilled physical therapy services  Planned modality interventions: electrical stimulation/Russian stimulation  Planned therapy interventions: fine motor coordination training, gait training, home exercise program, neuromuscular re-education, strengthening, therapeutic activities, transfer training, abdominal trunk stabilization, balance/weight-bearing training and orthotic fitting/training  Frequency: 1x week  Duration in visits: 12  Treatment plan discussed with: patient  Plan details: Patient will be seen 1x/wk x 12 visits with treatment to include strengthening, stretching, functional e-stim therapy, neuromuscular re-education, balance, gait and endurance training.         Timed:  Manual Therapy:    0     mins  03900;  Therapeutic Exercise:    10     mins  52184;     Neuromuscular Kisha:    0    mins  55026;    Therapeutic Activity:     0     mins  78254;     Gait  Trainin     mins  50775;     Ultrasound:     0     mins  53275;    Electrical Stimulation:    0     mins  77111 ( );    Untimed:  Electrical Stimulation:    0     mins  82354 ( );  Mechanical Traction:    0     mins  08445;     Timed Treatment:   10   mins   Total Treatment:     55   mins    PT SIGNATURE: Marj Resee, PT   DATE TREATMENT INITIATED: 2020    Initial Certification  Certification Period: 2021  I certify that the therapy services are furnished while this patient is under my care.  The services outlined above are required by this patient, and will be reviewed every 90 days.     PHYSICIAN: Isaac Torres MD      DATE:     Please sign and return via fax to [phone].. Thank you, [facility].

## 2020-12-02 NOTE — PROGRESS NOTES
Occupational Therapy Initial Evaluation and Plan of Care      Patient: Roselia Lobo   : 1940  Diagnosis/ICD-10 Code:  Decreased coordination [R27.8]  Referring practitioner: No ref. provider found  Date of Initial Visit: Type: THERAPY  Noted: 2020  Today's Date: 2020  Patient seen for 1 sessions      Subjective:     Subjective Questionnaire: 9HPT R: 34.67 L: 29.18  Mild tremor bilaterally, worse in R than L; decreased in-hand manipulation and translation bilaterally       Subjective Evaluation    History of Present Illness  Mechanism of injury: Pt had f/u with neurology who referred her for therapy secondary to being off balance and having falls.  Pt reports having over 100 falls this past year.  Pt reports having to crawl to a chair or bed to get up off the floor.  Pts son lives approx 2 miles a way and dtr in law calls often. Pt is still driving.  Pt performs all of her ADL's and takes care of her home with minimal cooking, mostly using frozen/microwave meals.  Neurologist would like to try the Ampyra medication again.  Pt is not exercising at home and takes small walks but mostly sitting in her recliner.  Pt has a walk-in shower w/seat and grab bar. Pt uses rollator that stays in her home and has a rwx that stays in her car. She admits that she often forgets to use her rollator. Pt had her son lower her mattress and is now able to get in/out of her bed w/o difficulty.  Pt states note being able to any longer perform buttons and having significant difficulty with HW. Pt now wears slip on shoes by sliding shoe laces into insides of shoes to increase ease.       Patient Occupation: retired from accounting Quality of life: fair    Pain  Current pain ratin  At best pain ratin  At worst pain ratin  Location: low back  Progression: no change    Social Support  Lives in: one-story house (3 [address]; 1HR)  Lives with: alone    Hand dominance: right    Treatments  Previous treatment: physical  therapy  Patient Goals  Patient goals for therapy: improved balance, increased strength and decreased pain  Patient goal: not fall so much         Objective          Active Range of Motion   Left Shoulder   Flexion: WFL    Right Shoulder   Flexion: WFL    Left Elbow   Flexion: WFL  Extension: WFL  Forearm supination: WFL  Forearm pronation: WFL    Right Elbow   Flexion: WFL  Extension: WFL  Forearm supination: WFL  Forearm pronation: WFL    Left Wrist   Wrist flexion: WFL  Wrist extension: WFL    Right Wrist   Wrist flexion: WFL  Wrist extension: WFl    Left Thumb   Opposition: Pt with N/T in bilateral hands, intermittent     Right Thumb   Opposition: Opposition intact bilaterally   Finger to nose, eyes closed intact bilaterally     Strength/Myotome Testing     Left Shoulder     Planes of Motion   Flexion: 4+     Right Shoulder     Planes of Motion   Flexion: 4+     Left Elbow   Flexion: 4+  Extension: 4+    Right Elbow   Flexion: 4+  Extension: 4+    Left Wrist/Hand   Wrist extension: 3+  Wrist flexion: 3+     (2nd hand position)     Trial 1: 29 lbs    Trial 2: 25 lbs    Trial 3: 30 lbs    Average: 28 lbs    Thumb Strength  Key/Lateral Pinch     Trial 1: 16 lbs    Trial 2: 14 lbs    Trial 3: 15 lbs    Average: 15 lbs    Right Wrist/Hand   Wrist extension: 4-  Wrist flexion: 4-     (2nd hand position)     Trial 1: 45 lbs    Trial 2: 45 lbs    Trial 3: 40 lbs    Average: 43.33 lbs    Thumb Strength   Key/Lateral Pinch     Trial 1: 16 lbs    Trial 2: 14 lbs    Trial 3: 11 lbs    Average: 13.67 lbs        OT Neuro         OT Exercises     Row Name 12/02/20 1345             Exercise 1    Exercise Name 1  OT IE completed per consult  -ST         Exercise 2    Exercise Name 2  medium soft resistance tputty exercises; issued written HEP  -ST        User Key  (r) = Recorded By, (t) = Taken By, (c) = Cosigned By    Initials Name Provider Type    Cinda Wren OTR Occupational Therapist           Assessment &  Plan     Assessment  Impairments: abnormal coordination, abnormal gait, activity intolerance, impaired balance, impaired physical strength, lacks appropriate home exercise program and safety issue  Assessment details: Pt presents with deficits related to her MS. She demonstrates generalized weakness along with significant L handed weakness and R FMC incoordination. These impairments affect pt's ability to perform ADLs and IADLs, jacqueline buttons, zippers and lace tying. Pt also has significant hx of falls and exhibits decreased safety with rwx use. Recommend skilled OT services to address these areas and increase safety, independence and participation in daily tasks to improve overall QOL and satisfaction.   Prognosis: fair  Functional Limitations: lifting, walking, pushing, moving in bed, standing, stooping, reaching behind back and unable to perform repetitive tasks  Goals  Plan Goals:   Pt will score 29 seconds or less R DOMINANT HAND on the 9HPT to demonstrate improved accuracy and speed with fine motor coordination by 8 wks.      Pt will increase L  strength to 35# by 8 wks to demonstrate improved strength and function for daily tasks.     Pt will be independent with hand strengthening HEP to increase independence with ADL/IADL performance tasks by 4 wks.    Pt will be independent with hand FMC HEP to increase independence with ADL/IADL performance tasks by 4 wks.     Pt will be independent with B UE strengthening HEP to increase performance in ADL/IADL tasks by 4 wks.         Plan  Planned therapy interventions: ADL retraining, balance/weight-bearing training, fine motor coordination training, flexibility, functional ROM exercises, home exercise program, motor coordination training, neuromuscular re-education, postural training, strengthening, stretching, therapeutic activities, transfer training and IADL retraining  Frequency: 1x week  Duration in visits: 8  Treatment plan discussed with: patient  Plan  details: Est OT POC and goals to reflect above deficits and promote increased independence, safety and engagement in daily tasks.         Timed:  Manual Therapy:    0     mins  00684;  Therapeutic Exercise:    0     mins  06957;     Neuromuscular Kisha:    0    mins  88397;    Therapeutic Activity:     15     mins  59524;     Self-Care/ADL     0     mins  82347;   Sensory Int. Tech      0     mins 63228;  Ultrasound:     0     mins  85137;    Electrical Stimulation:    0     mins  14638 ( );    Untimed:  Electrical Stimulation:    0     mins  08303 ( );    Timed Treatment:   15   mins   Total Treatment:     45   mins    OT Signature: Cinda Ruiz MS, OTR/L, CDP  [place] License #: [id number]  DATE TREATMENT INITIATED: 12/2/2020    Initial Certification Certification Period: 3/2/2021  I certify that the therapy services are furnished while this patient is under my care. The services outlined above are required by this patient and will be reviewed every 90 days.     Physician Signature: __________________________________      Please sign and return via fax to [phone]  Thank you,   [facility] Occupational Therapy

## 2020-12-02 NOTE — PROGRESS NOTES
"Physical Therapy Daily Progress Note  Visit: 2  Date of Initial Visit: Type: THERAPY  Noted: 2020    Roselia Lobo reports: \"I had a fall today when I was getting out of the shower.  I knew it was coming so I controlled myself towards the floor.\"    Subjective Evaluation    Pain  Current pain ratin  Location: hips           Objective   See Exercise, Manual, and Modality Logs for complete treatment.    Exercise 1  Exercise Name 1: NuStep B UE/LEs  Equipment/Resistance 1: level 5  Time: 10 min  Exercise 2  Exercise Name 2: St balance with visual imput and emhasis on midline orientation; standing center with red tband around ankles with single LE performing hip extension; B sidestepping keeping upright posture  Equipment/Resistance 2: red tband; min/mod A for standing posture and midline orientation  Sets/Reps 2: 10  Exercise 3  Exercise Name 3: Ambulation without AD with min A with emphasis on controlled stepping and placing feet and more upright posture  Equipment/Resistance 3: min A  Sets/Reps 3: 40', 60', 100' with quick standing break  Exercise 4  Exercise Name 4: St balance with emphasis on upright trunk and alternating tapping cone in front  Equipment/Resistance 4: cone; min/mod A; LOB 60% of the time with R LE stance  Sets/Reps 4: 10       PT Neuro          Assessment & Plan     Assessment  Assessment details: Pt requires min/mod A with standing balance activities with emphasis on more upright trunk and midline orientation.  Pt continues to demonstrate L lateral trunk flexion with R Trendelenburg.  Pt to benefit from skilled PT services to meet goals.    Plan  Plan details: Pt to benefit from skilled PT services to improve gait, balance, strength, and overall functional mobility.          Manual Therapy:     0     mins  58407;  Therapeutic Exercise:     15     mins  62325;     Neuromuscular Kisha:     15    mins  25832;    Therapeutic Activity:      0     mins  46230;     Gait Training:       10     " mins  46750;     Ultrasound:      0     mins  98365;    Electrical Stimulation:     0     mins  84361 ( );  Dry Needling      0     mins self-pay  Traction      0     mins 19911  Canalith Repositioning    0     mins 06359      Timed Treatment:   40   mins   Total Treatment:     40   mins    Marj Reese, PT  [place] License #: [id number]    Physical Therapist

## 2020-12-09 NOTE — PROGRESS NOTES
"Occupational Therapy Daily Progress Note  Visit: 2  Date of Initial Visit: Type: THERAPY  Noted: 2020      Patient: Roselia Lobo   : 1940  Diagnosis/ICD-10 Code:  Decreased coordination [R27.8]  Referring practitioner: Isaac Torres MD  Date of Initial Visit: Type: THERAPY  Noted: 2020  Today's Date: 2020  Patient seen for 2 sessions      Subjective:   Patient reports: \"I've been working that putty stuff you gave me.\"  Pain: 0/10  Subjective Questionnaire: n/a  Clinical Progress: improved  Home Program Compliance: Yes  Treatment has included: therapeutic exercise and therapeutic activity    Subjective   Objective     OT Neuro       SEE FLOW SHEETS AND ACTIVITY LOG FOR THERAPY TX DETAILS.       Assessment & Plan     Assessment  Assessment details: Pt demonstrates BUE fatigue w/any repetitive/sustained tasks that therefore will continue w/targeted UE strengthening. Issued t-band HEP w/pt demo'ing good and appropriate teach-back of technique.     Plan  Plan details: Continue w/POC and goals to reflect deficits and promote increased independence, safety and engagement in daily tasks        Visit Diagnoses:    ICD-10-CM ICD-9-CM   1. Decreased coordination  R27.8 781.3   2. Decreased  strength  R29.898 729.89             Timed:  Manual Therapy:    0     mins  67286;  Therapeutic Exercise:    30     mins  05528;     Neuromuscular Kisha:    0    mins  68401;    Therapeutic Activity:     13     mins  22843;     Self-Care/ADL     0     mins  58184;   Sensory Int. Tech      0     mins 35103;  Ultrasound:     0     mins  00875;    Electrical Stimulation:    0     mins  45846 ( );    Untimed:  Electrical Stimulation:    0     mins  43151 ( );    Timed Treatment:   43   mins   Total Treatment:     43   mins    OT Signature: Cinda Ruiz MS, OTR/L, CDP  [place] License #: [id number]    "

## 2020-12-11 NOTE — PROGRESS NOTES
"Physical Therapy Daily Progress Note  Visit: 3  Date of Initial Visit: Type: THERAPY  Noted: 2020    Roselia Lobo reports: \"I felt fine after we worked out last time.\"    Subjective Evaluation    Pain  No pain reported           Objective   See Exercise, Manual, and Modality Logs for complete treatment.    Exercise 1  Exercise Name 1: NuStep B UE/LEs  Equipment/Resistance 1: level 6  Time: 10 min  Exercise 2  Exercise Name 2: Sitting balance on swiss ball with B LE marching, TKE, B hip ab/adduction stepping, sitting with emphasis on upright posture  Equipment/Resistance 2: medium swiss ball; mod/max A; LOB 60% of the time  Sets/Reps 2: 10  Exercise 3  Exercise Name 3: ST balance with alternating stepping onto blue foam with one foot; hold one foot on blue foam with emphasis on upright posture  Equipment/Resistance 3: blue foam; min/mod A  Sets/Reps 3: 10  Exercise 4  Exercise Name 4: Sit to stand without UE A from heightened mat  Equipment/Resistance 4: hi/lo mat; min A; VCing to increase B hip flexion  Sets/Reps 4: 10  Exercise 5  Exercise Name 5: Ambulation with RW with emphasis on upright trunk, keeping walker closer and B LE clearance with swing  Equipment/Resistance 5: VCing  Sets/Reps 5: 200'                PT Neuro          Assessment & Plan     Assessment  Assessment details: Pt requires mod/max A with sitting balance on swiss ball and LOB up to 60% of the time.  Pt continues to demonstrate R lateral trunk flexion/trendleburg with gait.  Pt to benefit from skilled PT services to meet goals and improve functional mobility.    Plan  Plan details: Pt to benefit from skilled PT services to improve gait, balance, strength, and overall functional mobility.          Manual Therapy:     0     mins  37738;  Therapeutic Exercise:     10     mins  05995;     Neuromuscular Kisha:     20    mins  06324;    Therapeutic Activity:      10     mins  52255;     Gait Trainin     mins  34077;     Ultrasound:     "  0     mins  53698;    Electrical Stimulation:     0     mins  48969 ( );  Dry Needling      0     mins self-pay  Traction      0     mins 71052  Canalith Repositioning    0     mins 78808      Timed Treatment:   45   mins   Total Treatment:     45   mins    Marj Reese, PT  [place] License #: [id number]    Physical Therapist

## 2020-12-23 NOTE — TELEPHONE ENCOUNTER
----- Message from Erin Spencer sent at 12/23/2020 12:47 PM EST -----  Regarding: LYRICA  Contact: [phone]  Per daughter Genet, patient Roselia Lobo was take to [facility] on 12/19/2020 after missing 5 doses of Lyrica. Per daughter she is unresponsive and the hospital will not give patient her Lyrica. Per daughter this happened before when patient missed medication. She would like to know if missing Lyrica could cause patient to become unresponsive

## 2021-01-01 ENCOUNTER — DOCUMENTATION (OUTPATIENT)
Dept: PHYSICAL THERAPY | Facility: CLINIC | Age: 81
End: 2021-01-01

## 2021-01-04 NOTE — PROGRESS NOTES
Discharge Summary  Discharge Summary from Physical Therapy Report      Dates  PT visit: n/a  Number of Visits: 2     Discharge Status of Patient: See MD Note dated n/a    Goals: Not Met    Discharge Plan: Patient to return to referring/providing physician    Comments Pt was admitted to the hospital in [place] as reported by her dtr.  PT has called both numbers listed in the chart numerous times with no answer.  Pt to be discharged from therapy services.    Date of Discharge 1/4/2021        Marj Reese, PT  Physical Therapist